# Patient Record
Sex: MALE | Race: WHITE | NOT HISPANIC OR LATINO
[De-identification: names, ages, dates, MRNs, and addresses within clinical notes are randomized per-mention and may not be internally consistent; named-entity substitution may affect disease eponyms.]

---

## 2019-04-01 ENCOUNTER — APPOINTMENT (OUTPATIENT)
Dept: OTOLARYNGOLOGY | Facility: CLINIC | Age: 79
End: 2019-04-01
Payer: MEDICARE

## 2019-04-01 ENCOUNTER — APPOINTMENT (OUTPATIENT)
Age: 79
End: 2019-04-01
Payer: MEDICARE

## 2019-04-01 VITALS
HEART RATE: 76 BPM | DIASTOLIC BLOOD PRESSURE: 61 MMHG | HEIGHT: 68 IN | SYSTOLIC BLOOD PRESSURE: 128 MMHG | WEIGHT: 200 LBS | BODY MASS INDEX: 30.31 KG/M2

## 2019-04-01 DIAGNOSIS — Z78.9 OTHER SPECIFIED HEALTH STATUS: ICD-10-CM

## 2019-04-01 DIAGNOSIS — Z86.39 PERSONAL HISTORY OF OTHER ENDOCRINE, NUTRITIONAL AND METABOLIC DISEASE: ICD-10-CM

## 2019-04-01 DIAGNOSIS — R13.10 DYSPHAGIA, UNSPECIFIED: ICD-10-CM

## 2019-04-01 DIAGNOSIS — Z83.3 FAMILY HISTORY OF DIABETES MELLITUS: ICD-10-CM

## 2019-04-01 DIAGNOSIS — Z80.9 FAMILY HISTORY OF MALIGNANT NEOPLASM, UNSPECIFIED: ICD-10-CM

## 2019-04-01 DIAGNOSIS — H90.3 SENSORINEURAL HEARING LOSS, BILATERAL: ICD-10-CM

## 2019-04-01 DIAGNOSIS — Z86.79 PERSONAL HISTORY OF OTHER DISEASES OF THE CIRCULATORY SYSTEM: ICD-10-CM

## 2019-04-01 DIAGNOSIS — Z82.49 FAMILY HISTORY OF ISCHEMIC HEART DISEASE AND OTHER DISEASES OF THE CIRCULATORY SYSTEM: ICD-10-CM

## 2019-04-01 DIAGNOSIS — R43.9 UNSPECIFIED DISTURBANCES OF SMELL AND TASTE: ICD-10-CM

## 2019-04-01 DIAGNOSIS — Z87.891 PERSONAL HISTORY OF NICOTINE DEPENDENCE: ICD-10-CM

## 2019-04-01 PROCEDURE — 92557 COMPREHENSIVE HEARING TEST: CPT

## 2019-04-01 PROCEDURE — 31575 DIAGNOSTIC LARYNGOSCOPY: CPT

## 2019-04-01 PROCEDURE — 92567 TYMPANOMETRY: CPT

## 2019-04-01 PROCEDURE — 99203 OFFICE O/P NEW LOW 30 MIN: CPT | Mod: 25

## 2019-04-01 NOTE — ASSESSMENT
[FreeTextEntry1] : Cerumen, removed. I reassured him that his audiogram is quite good except very mild high-frequency bilateral loss would recommend yearly cerumen disimpaction and audiogram\par \par Metallic taste for 3 or 4 days. Check results of fungal and bacterial culture. Recommended no new mouthwashes or 2 status. He will very masked when working with this new paint, and will see his internist for a vitamin and metabolic panel if the symptoms persist longer than another 2 weeks. Followup pending those results with me. I reassured him I see no evidence of neoplasm on endoscopy or exam

## 2019-04-01 NOTE — HISTORY OF PRESENT ILLNESS
[de-identified] : Patient seen for the first time today. Reports 3 to four-day history of metallic taste in his tongue and gums, corresponds with using a new pastel paint. may have some mild mouth discomfort, nothing alarming. No dental issues.  Denies new toothpaste, mouthwash, oral exposures, previous episodes, medications, or hot substances. Improved when he were in mask while working with the same painted over the weekend.  No significant identification, dysphasia, concerned about possible neoplasm. New dental issues that he knows of. Denies spitting blood

## 2019-04-08 LAB — FUNGUS SPEC CULT ORG #8: ABNORMAL

## 2021-05-27 ENCOUNTER — NON-APPOINTMENT (OUTPATIENT)
Age: 81
End: 2021-05-27

## 2021-05-27 ENCOUNTER — APPOINTMENT (OUTPATIENT)
Dept: HEART AND VASCULAR | Facility: CLINIC | Age: 81
End: 2021-05-27
Payer: MEDICARE

## 2021-05-27 VITALS — TEMPERATURE: 97 F

## 2021-05-27 VITALS
OXYGEN SATURATION: 96 % | BODY MASS INDEX: 33.34 KG/M2 | SYSTOLIC BLOOD PRESSURE: 134 MMHG | DIASTOLIC BLOOD PRESSURE: 78 MMHG | HEIGHT: 68 IN | HEART RATE: 65 BPM | WEIGHT: 220 LBS

## 2021-05-27 DIAGNOSIS — Z00.00 ENCOUNTER FOR GENERAL ADULT MEDICAL EXAMINATION W/OUT ABNORMAL FINDINGS: ICD-10-CM

## 2021-05-27 PROCEDURE — 93000 ELECTROCARDIOGRAM COMPLETE: CPT

## 2021-05-27 PROCEDURE — 99214 OFFICE O/P EST MOD 30 MIN: CPT | Mod: 25

## 2021-05-27 PROCEDURE — 36415 COLL VENOUS BLD VENIPUNCTURE: CPT

## 2021-05-28 LAB
ALBUMIN SERPL ELPH-MCNC: 4.6 G/DL
ALP BLD-CCNC: 70 U/L
ALT SERPL-CCNC: 16 U/L
ANION GAP SERPL CALC-SCNC: 14 MMOL/L
AST SERPL-CCNC: 19 U/L
BASOPHILS # BLD AUTO: 0.02 K/UL
BASOPHILS NFR BLD AUTO: 0.3 %
BILIRUB SERPL-MCNC: 0.7 MG/DL
BUN SERPL-MCNC: 24 MG/DL
CALCIUM SERPL-MCNC: 9.8 MG/DL
CANCER AG15-3 SERPL-ACNC: 16.8 U/ML
CANCER AG19-9 SERPL-ACNC: 6 U/ML
CANCER AG27-29 SERPL-ACNC: 19.2 U/ML
CEA SERPL-MCNC: 2.9 NG/ML
CHLORIDE SERPL-SCNC: 104 MMOL/L
CHOLEST SERPL-MCNC: 146 MG/DL
CK SERPL-CCNC: 92 U/L
CO2 SERPL-SCNC: 22 MMOL/L
CREAT SERPL-MCNC: 1.26 MG/DL
EOSINOPHIL # BLD AUTO: 0.13 K/UL
EOSINOPHIL NFR BLD AUTO: 2 %
ESTIMATED AVERAGE GLUCOSE: 114 MG/DL
FERRITIN SERPL-MCNC: 205 NG/ML
GLUCOSE SERPL-MCNC: 95 MG/DL
HBA1C MFR BLD HPLC: 5.6 %
HCT VFR BLD CALC: 45.9 %
HDLC SERPL-MCNC: 46 MG/DL
HGB BLD-MCNC: 14.6 G/DL
IMM GRANULOCYTES NFR BLD AUTO: 0.3 %
LDLC SERPL CALC-MCNC: 80 MG/DL
LYMPHOCYTES # BLD AUTO: 1.85 K/UL
LYMPHOCYTES NFR BLD AUTO: 27.9 %
MAN DIFF?: NORMAL
MCHC RBC-ENTMCNC: 31.3 PG
MCHC RBC-ENTMCNC: 31.8 GM/DL
MCV RBC AUTO: 98.3 FL
MONOCYTES # BLD AUTO: 0.69 K/UL
MONOCYTES NFR BLD AUTO: 10.4 %
NEUTROPHILS # BLD AUTO: 3.91 K/UL
NEUTROPHILS NFR BLD AUTO: 59.1 %
NONHDLC SERPL-MCNC: 100 MG/DL
PLATELET # BLD AUTO: 152 K/UL
POTASSIUM SERPL-SCNC: 4.5 MMOL/L
PROT SERPL-MCNC: 6.8 G/DL
PSA SERPL-MCNC: 3.99 NG/ML
RBC # BLD: 4.67 M/UL
RBC # FLD: 12.8 %
SODIUM SERPL-SCNC: 140 MMOL/L
TRIGL SERPL-MCNC: 98 MG/DL
TSH SERPL-ACNC: 2.05 UIU/ML
VIT B12 SERPL-MCNC: 368 PG/ML
WBC # FLD AUTO: 6.62 K/UL

## 2021-05-28 NOTE — REASON FOR VISIT
[FreeTextEntry1] : pt worried about cancer colonscopy 2 years ago lung ct scan dermatologist 6 months ago psa and rectal exam pt with ascending aortic aneurysm 4.0 to 4.2

## 2021-05-28 NOTE — ASSESSMENT
[FreeTextEntry1] : Patient had left rib pain that is actually resolving on a CAT scan in March there was no sign of pathology cancer markers were all negative and patient is up-to-date inHas seen a skin doctor and has a PSA of 3.99 but for an 80-year-old man that is normal doubt that patient has metastatic disease to the bone will follow

## 2021-08-09 ENCOUNTER — NON-APPOINTMENT (OUTPATIENT)
Age: 81
End: 2021-08-09

## 2021-09-16 ENCOUNTER — APPOINTMENT (OUTPATIENT)
Dept: HEART AND VASCULAR | Facility: CLINIC | Age: 81
End: 2021-09-16

## 2021-11-19 ENCOUNTER — RX RENEWAL (OUTPATIENT)
Age: 81
End: 2021-11-19

## 2022-03-15 ENCOUNTER — APPOINTMENT (OUTPATIENT)
Dept: HEART AND VASCULAR | Facility: CLINIC | Age: 82
End: 2022-03-15
Payer: MEDICARE

## 2022-03-15 ENCOUNTER — NON-APPOINTMENT (OUTPATIENT)
Age: 82
End: 2022-03-15

## 2022-03-15 VITALS
TEMPERATURE: 96.5 F | HEART RATE: 66 BPM | SYSTOLIC BLOOD PRESSURE: 154 MMHG | DIASTOLIC BLOOD PRESSURE: 78 MMHG | HEIGHT: 68 IN | BODY MASS INDEX: 31.07 KG/M2 | WEIGHT: 205 LBS | OXYGEN SATURATION: 95 %

## 2022-03-15 PROCEDURE — 93306 TTE W/DOPPLER COMPLETE: CPT

## 2022-03-15 PROCEDURE — 99214 OFFICE O/P EST MOD 30 MIN: CPT | Mod: 25

## 2022-03-15 NOTE — ASSESSMENT
[FreeTextEntry1] : Patient reports that he had a normal CTA about 4 years ago patient has no chest pain or shortness of breath exercising for 2 to 3 hours but feels short of breath when he is walking in the street patient has no chest pain patient's echocardiogram does not reveal significant valvular disease patient will be scheduled for a stress test if he cannot perform that we will repeat the CTA his aorta measured 4.0 on a CT scan from March 31, 2021 and the aortic root did not look dilated on echocardiogram today

## 2022-03-15 NOTE — REASON FOR VISIT
[FreeTextEntry1] : pt in the gym twot three hours feels great when he execises but feels short of breath walking up the street ekg incompletee rbbb echocardiogram aortic sclerosis no stenosis

## 2022-04-07 ENCOUNTER — NON-APPOINTMENT (OUTPATIENT)
Age: 82
End: 2022-04-07

## 2022-04-10 DIAGNOSIS — M25.561 PAIN IN RIGHT KNEE: ICD-10-CM

## 2022-04-11 ENCOUNTER — APPOINTMENT (OUTPATIENT)
Dept: ORTHOPEDIC SURGERY | Facility: CLINIC | Age: 82
End: 2022-04-11
Payer: MEDICARE

## 2022-04-11 PROCEDURE — 99203 OFFICE O/P NEW LOW 30 MIN: CPT

## 2022-04-11 NOTE — DISCUSSION/SUMMARY
[de-identified] : Discussed at length the nature of the patient's condition. Their right knee symptoms appear secondary to a probably tear of the medial meniscus. Suggested we obtain an MRI of the right knee to evaluate intraarticular pathology. If MRI shows a meniscal tear, we will give thought to a surgical arthroscopy. In the interim, I have recommended a course of physiotherapy and Advil or Aleve prn. They can continue activities as tolerated. When results are available, we will discuss further.

## 2022-04-11 NOTE — HISTORY OF PRESENT ILLNESS
[de-identified] : 81 year old male presents for initial evaluation of right knee pain that started about 3 weeks ago. The pt denies any specific accident and/or injury. The pain is localized to the medial joint line and is sharp in nature. Pt uses a cane for ambulation and takes Tylenol prn. Pt denies any previous issues with the knees and states that the pain came out of nowhere.

## 2022-04-11 NOTE — PHYSICAL EXAM
[de-identified] : General appearance: well nourished and hydrated, pleasant, alert and oriented x 3, cooperative.\par HEENT: Normocephalic, EOM intact, Nasal septum midline, Oral cavity clear, External auditory canal clear.\par Cardiovascular: no apparent abnormalities, lower leg edema, no varicosities, pedal pulses are palpable.\par Lymphatics Lymph nodes: none palpated, Lymphedema: not present.\par Neurologic: sensation is normal, no muscle weakness in upper or lower extremities, patella tendon reflexes intact .\par Dermatologic no apparent skin lesions, moist, warm, no rash.\par Spine:cervical spine appears normal and moves freely, thoracic spine appears normal and moves freely, lumbosacral spine appears normal and moves freely.\par Gait: nonantalgic.\par \par Left knee\par Inspection: no effusion or erythema.\par Wounds: none.\par Alignment: normal.\par Palpation: no specific tenderness on palpation.\par ROM active (in degrees): 0-135\par Ligamentous laxity: all ligaments appear stable,, negative ant. drawer test, negative post. drawer test, stable to varus stress test, stable to valgus stress test. negative Lachman's test, negative pivot shift test\par Meniscal Test: negative McMurrays, negative Angela.\par Patellofemoral Alignment Test: Q angle-, normal.\par Muscle Test: good quad strength.\par \par Right knee\par Inspection: no effusion or erythema.\par Wounds: none.\par Alignment: normal.\par Palpation: medial joint line tenderness on palpation.\par ROM active (in degrees): 0-135 with patellofemoral crepitus,.\par Ligamentous laxity: all ligaments appear stable,, negative ant. drawer test, negative post. drawer test, stable to varus stress test, stable to valgus stress test. negative Lachman's test, negative pivot shift test\par Meniscal Test: positive McMurrays, negative Angela.\par Patellofemoral Alignment Test: Q angle-, normal.\par Muscle Test: good quad strength.\par \par Left hip\par Inspection: No swelling or ecchymosis. Surgical incision.\par Wounds: none.\par Palpation: non-tender.\par Stability: no instability.\par Strength: 5/5 all motor groups.\par ROM: no pain with FROM.\par Leg length: equal.\par \par Right hip\par Inspection: No swelling or ecchymosis. Surgical incision.\par Wounds: none.\par Palpation: non-tender.\par Stability: no instability.\par Strength: 5/5 all motor groups.\par ROM: no pain with FROM.\par Leg length: equal.\par \par Left ankle\par Inspection: no erythema noted, no swelling noted.\par Palpation: no pain on palpation .\par ROM: FROM without crepitus.\par Muscle strength: 5/5.\par Stability: no instability noted.\par \par Right ankle\par Inspection: no erythema noted, no swelling noted.\par ROM: FROM without crepitus.\par Palpation: no pain on palpation .\par Muscle strength: 5/5.\par Stability: no instability noted.\par \par Left foot\par Inspection: color, texture and turgor are normal.\par ROM: full range of motion of all joints without pain or crepitus.\par Palpation: no tenderness.\par Stability: no instability noted.\par \par Right foot\par Inspection: color, texture and turgor are normal.\par ROM: full range of motion of all joints without pain or crepitus.\par Palpation: no tenderness.\par Stability: no instability noted.\par \par Left shoulder\par Inspection: no muscle asymmetry, no atrophy.\par Palpation: no tenderness noted, ACJ non-tender.\par ROM: full active ROM, full passive ROM.\par Strength testing): anterior deltoid, supraspinatus, infraspinatus, subscapularis all 5/5.\par Stability test: ant. apprehension negative, post. apprehension negative, relocation test negative.\par Impingement Test: negative NEER.\par \par Right shoulder\par Inspection: no muscle asymmetry, no atrophy.\par Palpation: no tenderness noted, ACJ non-tender.\par ROM: full active ROM, full passive ROM.\par Strength testing): anterior deltoid, supraspinatus, infraspinatus, subscapularis all 5/5.\par Stability test: ant. apprehension negative, post. apprehension negative, relocation test negative.\par Impingement Test: negative NEER.\par Surgical Wounds: none.\par \par Left elbow\par Inspection: negative swelling.\par Wounds: none.\par Palpation: non-tender.\par ROM: full ROM.\par Strength: 5/5 all groups.\par Stability: no instability.\par Mass: none.\par \par Right elbow\par Inspection: negative swelling.\par Wounds: none.\par Palpation: non-tender.\par ROM: full ROM.\par Strength: 5/5 all groups.\par Stability: no instability.\par Mass: none.\par \par Left wrist\par Inspection: negative swelling.\par Wound: none.\par Palpation (bone): no tenderness.\par ROM: full ROM.\par Strength: full , good.\par \par Right wrist\par Inspection: negative swelling.\par Wound: none.\par Palpation (bone): no tenderness.\par ROM: full ROM.\par Strength: full , good.\par \par Left hand \par Inspection: no skin changes, normal appearance.\par Wounds: none. \par Strength: full , able to make full fist.\par Sensation: light touch intact all fingers and thumb.\par Vascular: good capillary refill < 3 seconds, all fingers and thumb.\par Mass: none. \par \par Right hand\par Inspection: no skin changes, normal appearance. \par Wounds: none.\par Strength: full , able to make full fist. \par Sensation: light touch intact all fingers and thumb. \par Vascular: good capillary refill < 3 seconds, all fingers and thumb.\par Mass: none. [de-identified] : X-rays were brought in by the patient. X-ray of the right knee dated 03/21/22 demonstrates normal alignment, joint space maintained, and patella at an appropriate height and central position.

## 2022-04-11 NOTE — END OF VISIT
[FreeTextEntry3] : This note was written by Bozena Bruce on 4/11/22 acting as scribe for Dr. Joseph Jaimes M.D.\par \par I, Dr. Joseph Jaimes, have read and attest that all the information, medical decision making and discharge instructions within are true and accurate.

## 2022-04-15 ENCOUNTER — APPOINTMENT (OUTPATIENT)
Dept: ORTHOPEDIC SURGERY | Facility: CLINIC | Age: 82
End: 2022-04-15
Payer: MEDICARE

## 2022-04-15 DIAGNOSIS — M17.11 UNILATERAL PRIMARY OSTEOARTHRITIS, RIGHT KNEE: ICD-10-CM

## 2022-04-15 DIAGNOSIS — S83.241A OTHER TEAR OF MEDIAL MENISCUS, CURRENT INJURY, RIGHT KNEE, INITIAL ENCOUNTER: ICD-10-CM

## 2022-04-15 PROCEDURE — 99441: CPT | Mod: 95

## 2022-04-19 ENCOUNTER — RX RENEWAL (OUTPATIENT)
Age: 82
End: 2022-04-19

## 2022-04-27 ENCOUNTER — APPOINTMENT (OUTPATIENT)
Dept: HEART AND VASCULAR | Facility: CLINIC | Age: 82
End: 2022-04-27
Payer: MEDICARE

## 2022-04-27 ENCOUNTER — LABORATORY RESULT (OUTPATIENT)
Age: 82
End: 2022-04-27

## 2022-04-27 VITALS
DIASTOLIC BLOOD PRESSURE: 74 MMHG | WEIGHT: 230 LBS | HEIGHT: 68 IN | SYSTOLIC BLOOD PRESSURE: 122 MMHG | BODY MASS INDEX: 34.86 KG/M2 | HEART RATE: 62 BPM

## 2022-04-27 DIAGNOSIS — E78.2 MIXED HYPERLIPIDEMIA: ICD-10-CM

## 2022-04-27 DIAGNOSIS — I71.2 THORACIC AORTIC ANEURYSM, W/OUT RUPTURE: ICD-10-CM

## 2022-04-27 DIAGNOSIS — I10 ESSENTIAL (PRIMARY) HYPERTENSION: ICD-10-CM

## 2022-04-27 PROCEDURE — 93015 CV STRESS TEST SUPVJ I&R: CPT

## 2022-04-27 PROCEDURE — 36415 COLL VENOUS BLD VENIPUNCTURE: CPT

## 2022-04-27 PROCEDURE — 99214 OFFICE O/P EST MOD 30 MIN: CPT | Mod: 25

## 2022-04-27 NOTE — ASSESSMENT
[FreeTextEntry1] : Patient had an echocardiogram in March that did not show LV dysfunction or significant valvular disease patient underwent stress testing today he has the same exercise capacity that he had 2 years ago he is limited by shortness of breath but no ischemic changes patient's knee did not hurt on the treadmill today advise deferring surgery as he is not symptomatic at this time

## 2022-04-27 NOTE — REVIEW OF SYSTEMS
[Dyspnea on exertion] : dyspnea during exertion [Negative] : Integumentary [FreeTextEntry9] : left rib  pain

## 2022-04-29 LAB
ALBUMIN SERPL ELPH-MCNC: 4.6 G/DL
ALP BLD-CCNC: 75 U/L
ALT SERPL-CCNC: 18 U/L
ANION GAP SERPL CALC-SCNC: 21 MMOL/L
APTT BLD: 28.3 SEC
AST SERPL-CCNC: 20 U/L
BASOPHILS # BLD AUTO: 0.03 K/UL
BASOPHILS NFR BLD AUTO: 0.3 %
BILIRUB SERPL-MCNC: 0.6 MG/DL
BUN SERPL-MCNC: 21 MG/DL
CALCIUM SERPL-MCNC: 9.3 MG/DL
CHLORIDE SERPL-SCNC: 106 MMOL/L
CHOLEST SERPL-MCNC: 145 MG/DL
CK SERPL-CCNC: 57 U/L
CO2 SERPL-SCNC: 17 MMOL/L
CREAT SERPL-MCNC: 1.51 MG/DL
EGFR: 46 ML/MIN/1.73M2
EOSINOPHIL # BLD AUTO: 0.19 K/UL
EOSINOPHIL NFR BLD AUTO: 1.8 %
ESTIMATED AVERAGE GLUCOSE: 114 MG/DL
FERRITIN SERPL-MCNC: 201 NG/ML
GLUCOSE SERPL-MCNC: 88 MG/DL
HBA1C MFR BLD HPLC: 5.6 %
HCT VFR BLD CALC: 44.9 %
HDLC SERPL-MCNC: 47 MG/DL
HGB BLD-MCNC: 14.5 G/DL
IMM GRANULOCYTES NFR BLD AUTO: 0.3 %
INR PPP: 0.91 RATIO
LDLC SERPL CALC-MCNC: 72 MG/DL
LYMPHOCYTES # BLD AUTO: 4.82 K/UL
LYMPHOCYTES NFR BLD AUTO: 45.4 %
MAN DIFF?: NORMAL
MCHC RBC-ENTMCNC: 31.5 PG
MCHC RBC-ENTMCNC: 32.3 GM/DL
MCV RBC AUTO: 97.6 FL
MONOCYTES # BLD AUTO: 0.98 K/UL
MONOCYTES NFR BLD AUTO: 9.2 %
NEUTROPHILS # BLD AUTO: 4.56 K/UL
NEUTROPHILS NFR BLD AUTO: 43 %
NONHDLC SERPL-MCNC: 98 MG/DL
PLATELET # BLD AUTO: 190 K/UL
POTASSIUM SERPL-SCNC: 4.4 MMOL/L
PROT SERPL-MCNC: 6.8 G/DL
PT BLD: 10.6 SEC
RBC # BLD: 4.6 M/UL
RBC # FLD: 12.4 %
SODIUM SERPL-SCNC: 144 MMOL/L
TRIGL SERPL-MCNC: 128 MG/DL
TSH SERPL-ACNC: 4.76 UIU/ML
VIT B12 SERPL-MCNC: 393 PG/ML
WBC # FLD AUTO: 10.61 K/UL

## 2022-05-10 ENCOUNTER — APPOINTMENT (OUTPATIENT)
Dept: ORTHOPEDIC SURGERY | Facility: HOSPITAL | Age: 82
End: 2022-05-10

## 2022-05-11 ENCOUNTER — APPOINTMENT (OUTPATIENT)
Dept: HEART AND VASCULAR | Facility: CLINIC | Age: 82
End: 2022-05-11

## 2022-05-23 ENCOUNTER — APPOINTMENT (OUTPATIENT)
Dept: ORTHOPEDIC SURGERY | Facility: CLINIC | Age: 82
End: 2022-05-23

## 2022-06-13 DIAGNOSIS — U07.1 COVID-19: ICD-10-CM

## 2022-07-13 ENCOUNTER — RX RENEWAL (OUTPATIENT)
Age: 82
End: 2022-07-13

## 2022-12-23 RX ORDER — AZITHROMYCIN 250 MG/1
250 TABLET, FILM COATED ORAL
Qty: 1 | Refills: 3 | Status: ACTIVE | COMMUNITY
Start: 2022-12-23 | End: 1900-01-01

## 2023-01-02 ENCOUNTER — NON-APPOINTMENT (OUTPATIENT)
Age: 83
End: 2023-01-02

## 2023-01-04 ENCOUNTER — NON-APPOINTMENT (OUTPATIENT)
Age: 83
End: 2023-01-04

## 2023-02-09 ENCOUNTER — APPOINTMENT (OUTPATIENT)
Dept: HEART AND VASCULAR | Facility: CLINIC | Age: 83
End: 2023-02-09
Payer: MEDICARE

## 2023-02-09 VITALS — HEART RATE: 64 BPM | SYSTOLIC BLOOD PRESSURE: 120 MMHG | DIASTOLIC BLOOD PRESSURE: 72 MMHG

## 2023-02-09 DIAGNOSIS — R06.09 OTHER FORMS OF DYSPNEA: ICD-10-CM

## 2023-02-09 PROCEDURE — 36415 COLL VENOUS BLD VENIPUNCTURE: CPT

## 2023-02-09 PROCEDURE — 93306 TTE W/DOPPLER COMPLETE: CPT

## 2023-02-09 PROCEDURE — 99214 OFFICE O/P EST MOD 30 MIN: CPT | Mod: 25

## 2023-02-09 PROCEDURE — 93015 CV STRESS TEST SUPVJ I&R: CPT

## 2023-02-09 NOTE — REASON FOR VISIT
[Hyperlipidemia] : hyperlipidemia [Hypertension] : hypertension [Symptom and Test Evaluation] : symptom and test evaluation [Other: ____] : [unfilled]

## 2023-02-12 LAB
ALBUMIN SERPL ELPH-MCNC: 4.6 G/DL
ALP BLD-CCNC: 85 U/L
ALT SERPL-CCNC: 23 U/L
ANION GAP SERPL CALC-SCNC: 15 MMOL/L
AST SERPL-CCNC: 21 U/L
BASOPHILS # BLD AUTO: 0.04 K/UL
BASOPHILS NFR BLD AUTO: 0.5 %
BILIRUB SERPL-MCNC: 0.5 MG/DL
BUN SERPL-MCNC: 22 MG/DL
CALCIUM SERPL-MCNC: 10.2 MG/DL
CHLORIDE SERPL-SCNC: 103 MMOL/L
CHOLEST SERPL-MCNC: 131 MG/DL
CK SERPL-CCNC: 69 U/L
CO2 SERPL-SCNC: 22 MMOL/L
CREAT SERPL-MCNC: 1.48 MG/DL
EGFR: 47 ML/MIN/1.73M2
EOSINOPHIL # BLD AUTO: 0.21 K/UL
EOSINOPHIL NFR BLD AUTO: 2.4 %
ESTIMATED AVERAGE GLUCOSE: 117 MG/DL
FERRITIN SERPL-MCNC: 292 NG/ML
GLUCOSE SERPL-MCNC: 91 MG/DL
HBA1C MFR BLD HPLC: 5.7 %
HCT VFR BLD CALC: 45.4 %
HDLC SERPL-MCNC: 44 MG/DL
HGB BLD-MCNC: 14.6 G/DL
IMM GRANULOCYTES NFR BLD AUTO: 0.3 %
LDLC SERPL CALC-MCNC: 61 MG/DL
LYMPHOCYTES # BLD AUTO: 3.06 K/UL
LYMPHOCYTES NFR BLD AUTO: 35.2 %
MAN DIFF?: NORMAL
MCHC RBC-ENTMCNC: 30.9 PG
MCHC RBC-ENTMCNC: 32.2 GM/DL
MCV RBC AUTO: 96 FL
MONOCYTES # BLD AUTO: 0.79 K/UL
MONOCYTES NFR BLD AUTO: 9.1 %
NEUTROPHILS # BLD AUTO: 4.56 K/UL
NEUTROPHILS NFR BLD AUTO: 52.5 %
NONHDLC SERPL-MCNC: 87 MG/DL
PLATELET # BLD AUTO: 197 K/UL
POTASSIUM SERPL-SCNC: 5.1 MMOL/L
PROT SERPL-MCNC: 6.9 G/DL
RBC # BLD: 4.73 M/UL
RBC # FLD: 13 %
SODIUM SERPL-SCNC: 140 MMOL/L
TRIGL SERPL-MCNC: 128 MG/DL
TSH SERPL-ACNC: 2.13 UIU/ML
VIT B12 SERPL-MCNC: 474 PG/ML
WBC # FLD AUTO: 8.69 K/UL

## 2023-02-14 NOTE — ADDENDUM
[FreeTextEntry1] : d/w dr edwin jara hydrate before and after proceedure need definitive dx for salmon

## 2023-02-14 NOTE — ASSESSMENT
[FreeTextEntry1] : Patient had an echocardiogram today that did not show LV dysfunction or significant valvular disease patient underwent stress testing today he has the diminished  exercise capacity that he had 2 years ago he is limited by shortness of breath but no ischemic changes patient has very limited exercise tolerance he has a right bundle branch block no obvious etiology on considering doing a CTA though his creatinine is 1.48 to rule out significant CAD will discuss with Dr. Michael CTA versus nuclear stress test

## 2023-02-14 NOTE — HISTORY OF PRESENT ILLNESS
[FreeTextEntry1] : Patient complaining of shortness of breath on exertion feels that this is worse over the last month or 2 patient says that Dr. 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

## 2023-02-14 NOTE — HISTORY OF PRESENT ILLNESS
[FreeTextEntry1] : pt with dyspnea pt has hx of asbestosis exposure followed with yearly ct scans dr levy\par pt with aaa 4.5 \par pt with htn on losartan/hctz/metopolol

## 2023-02-15 ENCOUNTER — OUTPATIENT (OUTPATIENT)
Dept: OUTPATIENT SERVICES | Facility: HOSPITAL | Age: 83
LOS: 1 days | End: 2023-02-15
Payer: MEDICARE

## 2023-02-15 ENCOUNTER — APPOINTMENT (OUTPATIENT)
Dept: CT IMAGING | Facility: HOSPITAL | Age: 83
End: 2023-02-15
Payer: MEDICARE

## 2023-02-15 LAB — POCT ISTAT CREATININE: 1.6 MG/DL — HIGH (ref 0.5–1.3)

## 2023-02-15 PROCEDURE — 75574 CT ANGIO HRT W/3D IMAGE: CPT | Mod: 26,MH

## 2023-02-15 PROCEDURE — 82565 ASSAY OF CREATININE: CPT

## 2023-02-15 PROCEDURE — 75574 CT ANGIO HRT W/3D IMAGE: CPT

## 2023-03-04 ENCOUNTER — RX RENEWAL (OUTPATIENT)
Age: 83
End: 2023-03-04

## 2023-03-28 ENCOUNTER — APPOINTMENT (OUTPATIENT)
Dept: HEART AND VASCULAR | Facility: CLINIC | Age: 83
End: 2023-03-28
Payer: MEDICARE

## 2023-03-28 DIAGNOSIS — Q75.9 CONGENITAL MALFORMATION OF SKULL AND FACE BONES, UNSPECIFIED: ICD-10-CM

## 2023-03-28 PROCEDURE — 36415 COLL VENOUS BLD VENIPUNCTURE: CPT

## 2023-03-28 PROCEDURE — 99214 OFFICE O/P EST MOD 30 MIN: CPT | Mod: 25

## 2023-03-28 NOTE — REVIEW OF SYSTEMS
[Dyspnea on exertion] : dyspnea during exertion [Negative] : Integumentary [FreeTextEntry9] : left skull pain

## 2023-03-28 NOTE — PHYSICAL EXAM
[Well Developed] : well developed [Well Nourished] : well nourished [No Acute Distress] : no acute distress [Normal Conjunctiva] : normal conjunctiva [Normal Venous Pressure] : normal venous pressure [No Carotid Bruit] : no carotid bruit [Normal S1, S2] : normal S1, S2 [No Murmur] : no murmur [No Rub] : no rub [No Gallop] : no gallop [Clear Lung Fields] : clear lung fields [Good Air Entry] : good air entry [No Respiratory Distress] : no respiratory distress  [Soft] : abdomen soft [Non Tender] : non-tender [No Masses/organomegaly] : no masses/organomegaly [Normal Bowel Sounds] : normal bowel sounds [Normal Gait] : normal gait [No Edema] : no edema [No Cyanosis] : no cyanosis [No Clubbing] : no clubbing [No Varicosities] : no varicosities [No Rash] : no rash [No Skin Lesions] : no skin lesions [Moves all extremities] : moves all extremities [No Focal Deficits] : no focal deficits [Normal Speech] : normal speech [Alert and Oriented] : alert and oriented [Normal memory] : normal memory [de-identified] : slight raise area of left posterior skull

## 2023-03-28 NOTE — ASSESSMENT
[FreeTextEntry1] : d/w radiology and neurology with obtain mri with and without contrast of the brain and skull\par esr and crp

## 2023-03-29 LAB
CRP SERPL-MCNC: <3 MG/L
ERYTHROCYTE [SEDIMENTATION RATE] IN BLOOD BY WESTERGREN METHOD: 7 MM/HR

## 2023-03-30 ENCOUNTER — APPOINTMENT (OUTPATIENT)
Dept: NEUROLOGY | Facility: CLINIC | Age: 83
End: 2023-03-30
Payer: MEDICARE

## 2023-03-30 VITALS
BODY MASS INDEX: 31.83 KG/M2 | HEIGHT: 68 IN | TEMPERATURE: 95.7 F | HEART RATE: 55 BPM | SYSTOLIC BLOOD PRESSURE: 117 MMHG | OXYGEN SATURATION: 96 % | WEIGHT: 210 LBS | DIASTOLIC BLOOD PRESSURE: 74 MMHG

## 2023-03-30 PROCEDURE — 99203 OFFICE O/P NEW LOW 30 MIN: CPT

## 2023-03-30 RX ORDER — GABAPENTIN 100 MG/1
100 CAPSULE ORAL
Qty: 60 | Refills: 0 | Status: ACTIVE | COMMUNITY
Start: 2023-03-30 | End: 1900-01-01

## 2023-04-06 ENCOUNTER — OUTPATIENT (OUTPATIENT)
Dept: OUTPATIENT SERVICES | Facility: HOSPITAL | Age: 83
LOS: 1 days | End: 2023-04-06
Payer: MEDICARE

## 2023-04-06 ENCOUNTER — APPOINTMENT (OUTPATIENT)
Dept: MRI IMAGING | Facility: HOSPITAL | Age: 83
End: 2023-04-06
Payer: MEDICARE

## 2023-04-06 PROCEDURE — 70553 MRI BRAIN STEM W/O & W/DYE: CPT

## 2023-04-06 PROCEDURE — 70553 MRI BRAIN STEM W/O & W/DYE: CPT | Mod: 26,MH

## 2023-04-06 PROCEDURE — A9585: CPT

## 2023-04-07 ENCOUNTER — NON-APPOINTMENT (OUTPATIENT)
Age: 83
End: 2023-04-07

## 2023-05-04 ENCOUNTER — RX RENEWAL (OUTPATIENT)
Age: 83
End: 2023-05-04

## 2023-07-17 NOTE — PHYSICAL EXAM
[FreeTextEntry1] : The patient is alert and oriented x3, naming intact x3, repetition normal, follows three-step commands, and is able to participate fully in the history taking.\par Speech is normal with no evidence of dysarthria. Essentiial tremor dysphonia\par Memory is intact: Immediate recall 3 out of 3, short-term 3 out of 3, remote memory intact\par Cranial nerves II through XII intact\par Motor exam: Upper and lower extremities 5 out of 5 power, normal tone. Essential tremor of both UE left greater than right \par Sensory exam: Intact to light touch and pinprick. Romberg negative.\par Coordination and vestibular exam: Finger to nose intact, no evidence of truncal or appendicular ataxia. No evidence of nystagmus. no vestibular symptoms elicited with head turning during ambulation.\par Gait: Normal stance and gait.\par Reflexes: One to 2+ in upper and lower extremities. No pathological reflexes. Downgoing toes.\par \par HEENT: Normocephalic atraumatic. tenderness of the right occipital notch. \par Funduscopic: No disc edema\par Neck supple with no JVD. No evidence of carotid bruit.\par Cardiac: S1-S2 regular rate and rhythm, no murmur noted.\par Chest: Clear to auscultation\par Abdomen: nontender, normal bowel sounds, soft\par Extremity: No edema, normal pulses.\par \par

## 2023-07-17 NOTE — ASSESSMENT
[FreeTextEntry1] : patient prefers to start gabapentin and see if that is enough to get rid of headache. \par Consider nerve block.

## 2023-07-17 NOTE — HISTORY OF PRESENT ILLNESS
[FreeTextEntry1] : CC; headache\par \par Patient has left sided headache that starts in the back of the head and radiates forward.. OTC meds have not helped and he describes that pain as sharp and radiating.

## 2023-08-15 ENCOUNTER — RX RENEWAL (OUTPATIENT)
Age: 83
End: 2023-08-15

## 2023-09-27 ENCOUNTER — NON-APPOINTMENT (OUTPATIENT)
Age: 83
End: 2023-09-27

## 2023-09-29 ENCOUNTER — RX RENEWAL (OUTPATIENT)
Age: 83
End: 2023-09-29

## 2023-10-03 ENCOUNTER — RX RENEWAL (OUTPATIENT)
Age: 83
End: 2023-10-03

## 2023-10-05 DIAGNOSIS — G47.00 INSOMNIA, UNSPECIFIED: ICD-10-CM

## 2023-10-09 ENCOUNTER — EMERGENCY (EMERGENCY)
Facility: HOSPITAL | Age: 83
LOS: 1 days | Discharge: HOME | End: 2023-10-09
Attending: EMERGENCY MEDICINE | Admitting: EMERGENCY MEDICINE
Payer: MEDICARE

## 2023-10-09 VITALS
SYSTOLIC BLOOD PRESSURE: 151 MMHG | HEIGHT: 68 IN | HEART RATE: 69 BPM | RESPIRATION RATE: 20 BRPM | OXYGEN SATURATION: 94 % | WEIGHT: 209.44 LBS | DIASTOLIC BLOOD PRESSURE: 74 MMHG | TEMPERATURE: 98 F

## 2023-10-09 VITALS
DIASTOLIC BLOOD PRESSURE: 69 MMHG | RESPIRATION RATE: 18 BRPM | OXYGEN SATURATION: 96 % | HEART RATE: 75 BPM | SYSTOLIC BLOOD PRESSURE: 159 MMHG

## 2023-10-09 LAB
ALBUMIN SERPL ELPH-MCNC: 4 G/DL — SIGNIFICANT CHANGE UP (ref 3.3–5)
ALP SERPL-CCNC: 65 U/L — SIGNIFICANT CHANGE UP (ref 40–120)
ALT FLD-CCNC: 16 U/L — SIGNIFICANT CHANGE UP (ref 10–45)
ANION GAP SERPL CALC-SCNC: 9 MMOL/L — SIGNIFICANT CHANGE UP (ref 5–17)
APTT BLD: 29.7 SEC — SIGNIFICANT CHANGE UP (ref 24.5–35.6)
AST SERPL-CCNC: 18 U/L — SIGNIFICANT CHANGE UP (ref 10–40)
BASOPHILS # BLD AUTO: 0.02 K/UL — SIGNIFICANT CHANGE UP (ref 0–0.2)
BASOPHILS NFR BLD AUTO: 0.4 % — SIGNIFICANT CHANGE UP (ref 0–2)
BILIRUB SERPL-MCNC: 0.5 MG/DL — SIGNIFICANT CHANGE UP (ref 0.2–1.2)
BUN SERPL-MCNC: 19 MG/DL — SIGNIFICANT CHANGE UP (ref 7–23)
CALCIUM SERPL-MCNC: 9.3 MG/DL — SIGNIFICANT CHANGE UP (ref 8.4–10.5)
CHLORIDE SERPL-SCNC: 106 MMOL/L — SIGNIFICANT CHANGE UP (ref 96–108)
CO2 SERPL-SCNC: 25 MMOL/L — SIGNIFICANT CHANGE UP (ref 22–31)
CREAT SERPL-MCNC: 1.31 MG/DL — HIGH (ref 0.5–1.3)
EGFR: 54 ML/MIN/1.73M2 — LOW
EOSINOPHIL # BLD AUTO: 0.13 K/UL — SIGNIFICANT CHANGE UP (ref 0–0.5)
EOSINOPHIL NFR BLD AUTO: 2.4 % — SIGNIFICANT CHANGE UP (ref 0–6)
GLUCOSE SERPL-MCNC: 90 MG/DL — SIGNIFICANT CHANGE UP (ref 70–99)
HCT VFR BLD CALC: 41.4 % — SIGNIFICANT CHANGE UP (ref 39–50)
HGB BLD-MCNC: 13.8 G/DL — SIGNIFICANT CHANGE UP (ref 13–17)
IMM GRANULOCYTES NFR BLD AUTO: 0.5 % — SIGNIFICANT CHANGE UP (ref 0–0.9)
INR BLD: 0.93 — SIGNIFICANT CHANGE UP (ref 0.85–1.18)
LYMPHOCYTES # BLD AUTO: 1.37 K/UL — SIGNIFICANT CHANGE UP (ref 1–3.3)
LYMPHOCYTES # BLD AUTO: 24.8 % — SIGNIFICANT CHANGE UP (ref 13–44)
MCHC RBC-ENTMCNC: 31.4 PG — SIGNIFICANT CHANGE UP (ref 27–34)
MCHC RBC-ENTMCNC: 33.3 GM/DL — SIGNIFICANT CHANGE UP (ref 32–36)
MCV RBC AUTO: 94.1 FL — SIGNIFICANT CHANGE UP (ref 80–100)
MONOCYTES # BLD AUTO: 0.44 K/UL — SIGNIFICANT CHANGE UP (ref 0–0.9)
MONOCYTES NFR BLD AUTO: 8 % — SIGNIFICANT CHANGE UP (ref 2–14)
NEUTROPHILS # BLD AUTO: 3.53 K/UL — SIGNIFICANT CHANGE UP (ref 1.8–7.4)
NEUTROPHILS NFR BLD AUTO: 63.9 % — SIGNIFICANT CHANGE UP (ref 43–77)
NRBC # BLD: 0 /100 WBCS — SIGNIFICANT CHANGE UP (ref 0–0)
PLATELET # BLD AUTO: 156 K/UL — SIGNIFICANT CHANGE UP (ref 150–400)
POTASSIUM SERPL-MCNC: 4.4 MMOL/L — SIGNIFICANT CHANGE UP (ref 3.5–5.3)
POTASSIUM SERPL-SCNC: 4.4 MMOL/L — SIGNIFICANT CHANGE UP (ref 3.5–5.3)
PROT SERPL-MCNC: 6.5 G/DL — SIGNIFICANT CHANGE UP (ref 6–8.3)
PROTHROM AB SERPL-ACNC: 10.6 SEC — SIGNIFICANT CHANGE UP (ref 9.5–13)
RBC # BLD: 4.4 M/UL — SIGNIFICANT CHANGE UP (ref 4.2–5.8)
RBC # FLD: 12.5 % — SIGNIFICANT CHANGE UP (ref 10.3–14.5)
SODIUM SERPL-SCNC: 140 MMOL/L — SIGNIFICANT CHANGE UP (ref 135–145)
TROPONIN T, HIGH SENSITIVITY RESULT: 10 NG/L — SIGNIFICANT CHANGE UP (ref 0–51)
WBC # BLD: 5.52 K/UL — SIGNIFICANT CHANGE UP (ref 3.8–10.5)
WBC # FLD AUTO: 5.52 K/UL — SIGNIFICANT CHANGE UP (ref 3.8–10.5)

## 2023-10-09 PROCEDURE — 82962 GLUCOSE BLOOD TEST: CPT

## 2023-10-09 PROCEDURE — 84484 ASSAY OF TROPONIN QUANT: CPT

## 2023-10-09 PROCEDURE — 70450 CT HEAD/BRAIN W/O DYE: CPT | Mod: MA

## 2023-10-09 PROCEDURE — G1004: CPT

## 2023-10-09 PROCEDURE — 70551 MRI BRAIN STEM W/O DYE: CPT | Mod: 26,MG

## 2023-10-09 PROCEDURE — 99291 CRITICAL CARE FIRST HOUR: CPT

## 2023-10-09 PROCEDURE — 70496 CT ANGIOGRAPHY HEAD: CPT | Mod: 26,MA

## 2023-10-09 PROCEDURE — 70498 CT ANGIOGRAPHY NECK: CPT | Mod: MA

## 2023-10-09 PROCEDURE — 85730 THROMBOPLASTIN TIME PARTIAL: CPT

## 2023-10-09 PROCEDURE — 70551 MRI BRAIN STEM W/O DYE: CPT | Mod: MG

## 2023-10-09 PROCEDURE — 70498 CT ANGIOGRAPHY NECK: CPT | Mod: 26,MA

## 2023-10-09 PROCEDURE — 36415 COLL VENOUS BLD VENIPUNCTURE: CPT

## 2023-10-09 PROCEDURE — 93010 ELECTROCARDIOGRAM REPORT: CPT

## 2023-10-09 PROCEDURE — 80053 COMPREHEN METABOLIC PANEL: CPT

## 2023-10-09 PROCEDURE — 99285 EMERGENCY DEPT VISIT HI MDM: CPT | Mod: 25

## 2023-10-09 PROCEDURE — 85610 PROTHROMBIN TIME: CPT

## 2023-10-09 PROCEDURE — 0042T: CPT | Mod: MA

## 2023-10-09 PROCEDURE — 70496 CT ANGIOGRAPHY HEAD: CPT | Mod: MA

## 2023-10-09 PROCEDURE — 85025 COMPLETE CBC W/AUTO DIFF WBC: CPT

## 2023-10-09 PROCEDURE — 93005 ELECTROCARDIOGRAM TRACING: CPT

## 2023-10-09 PROCEDURE — 70450 CT HEAD/BRAIN W/O DYE: CPT | Mod: 26,MA,XU

## 2023-10-09 RX ORDER — DIAZEPAM 5 MG
1 TABLET ORAL
Refills: 0 | DISCHARGE

## 2023-10-09 RX ORDER — MIRABEGRON 50 MG/1
1 TABLET, EXTENDED RELEASE ORAL
Refills: 0 | DISCHARGE

## 2023-10-09 RX ORDER — METOPROLOL TARTRATE 50 MG
1 TABLET ORAL
Refills: 0 | DISCHARGE

## 2023-10-09 RX ORDER — LOSARTAN POTASSIUM 100 MG/1
1 TABLET, FILM COATED ORAL
Refills: 0 | DISCHARGE

## 2023-10-09 RX ORDER — SODIUM CHLORIDE 9 MG/ML
500 INJECTION INTRAMUSCULAR; INTRAVENOUS; SUBCUTANEOUS ONCE
Refills: 0 | Status: COMPLETED | OUTPATIENT
Start: 2023-10-09 | End: 2023-10-09

## 2023-10-09 RX ORDER — ATORVASTATIN CALCIUM 80 MG/1
1 TABLET, FILM COATED ORAL
Refills: 0 | DISCHARGE

## 2023-10-09 RX ADMIN — SODIUM CHLORIDE 500 MILLILITER(S): 9 INJECTION INTRAMUSCULAR; INTRAVENOUS; SUBCUTANEOUS at 12:16

## 2023-10-09 NOTE — ED PROVIDER NOTE - PROGRESS NOTE DETAILS
CT head neg, ct perfusion neg, cta w mild L carotid stenosis.  Stroke team ordered a short stroke mri.  Await results and stroke recs/dispo. MRI neg.  Pt cleared for outpt fu w Dr Montes who has seen pt prev.  Pt's pmd, Dr Breen, contacted w update.  Pt denies jimenez, feeling well.  Pt did not take bp meds today; preferred to take once he goes home.

## 2023-10-09 NOTE — ED PROVIDER NOTE - CARE PLAN
1 Principal Discharge DX:	Abnormal gait  Secondary Diagnosis:	Confusion  Secondary Diagnosis:	Headache

## 2023-10-09 NOTE — ED PROVIDER NOTE - PHYSICAL EXAMINATION
VITAL SIGNS: I have reviewed nursing notes and confirm.  CONSTITUTIONAL:  in no acute distress.   SKIN:  warm and dry, no acute rash.   HEAD:  normocephalic, atraumatic.  EYES: PERRL, EOM intact; conjunctiva and sclera clear.  ENT: No nasal discharge; airway clear.   NECK: Supple; non tender.  CARD: S1, S2 normal; no murmurs, gallops, or rubs. Regular rate and rhythm.   RESP:  Clear to auscultation b/l, no wheezes, rales or rhonchi.  ABD: Normal bowel sounds; soft; non-distended; non-tender; no guarding/ rebound.  MSK: Normal ROM. No clubbing, cyanosis or edema. no ttp bilat le  NEURO: awake, alert, oriented x 3, CN II-XII grossly intact, motor 5/5, no gross sens deficits, speech clear but hesitating  PSYCH: Cooperative, mood and affect appropriate. VITAL SIGNS: I have reviewed nursing notes and confirm.  CONSTITUTIONAL:  in no acute distress.   SKIN:  warm and dry, no acute rash.   HEAD:  normocephalic, atraumatic.  EYES: PERRL, EOM intact; conjunctiva and sclera clear.  ENT: No nasal discharge; airway clear.   NECK: Supple; non tender.  CARD: S1, S2 normal; no murmurs, gallops, or rubs. Regular rate and rhythm.   RESP:  Clear to auscultation b/l, no wheezes, rales or rhonchi.  ABD: Normal bowel sounds; soft; non-distended; non-tender; no guarding/ rebound.  MSK: Normal ROM. No clubbing, cyanosis or edema. no ttp bilat le  NEURO: awake, alert, oriented x 3, CN II-XII grossly intact, motor 5/5, no gross sens deficits, speech clear but hesitating, slight leaning to R when ambulating  PSYCH: Cooperative, mood and affect appropriate.

## 2023-10-09 NOTE — CONSULT NOTE ADULT - ASSESSMENT
83y Male with PMHx of 84 yo M h/o HTN, CAD, AAA(4 cm, stable per pt), occipital neuralgia (prescribed gabapentin in the past by Dr. Montes, [facility], [place] presents to the ED for evaluation of ongoing headache, unsteady gait since 10/7, and slow speech noted upon waking this morning. Stroke code called in the ED. NIHSS 0, mild right leaning while walking. 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 MRI done in the ED negative for any ischemic changes since prior MR in April 2023. Offered General Neurology eval in the ED but patient prefers to follow up outpatient.     Recommendations:  - Continue ASA 81mg daily  - Continue high dose statin  - Please follow up with your PCP and your neurologist Dr. Alissa Montes    Case discussed with neurology attending Dr. Mesha Gallego.

## 2023-10-09 NOTE — CONSULT NOTE ADULT - SUBJECTIVE AND OBJECTIVE BOX
**STROKE CODE CONSULT NOTE**    Last known well time/Time of onset of symptoms: 10/7/2023    HPI: 83y Male with PMHx of 82 yo M h/o HTN, CAD, AAA(4 cm, stable per pt), occipital neuralgia (prescribed gabapentin in the past by Dr. Montes, HLD, Torres Martinez presents to the ED for evaluation of headache, unsteady gait, and slow speech. The patient has been treated for occipital neuralgia in the past with gabapentin that he felt like resolved his occipital headache. He has been under a lot of stress with timelines for work but developed a frontal headache for the past couple of weeks. The headache comes and goes and has not been persistent, he has not noted any pattern. On 10/7 he began feeling unbalanced and slightly unsteady on his feet. He has a remote history of vertigo but this felt different then the vertigo he experienced in the past. He did not experience any room spinning but felt as though he was spinning. This morning since he work up he felt as though he is able to converse but the words are coming slowly to him. Stroke code called in the ED. NIHSS 0, patient able to ambulate without assistance (rarely needs a cane at baseline), gait narrow and steady, appears to be slightly right leaning. CTH negative for any acute intracranial pathology. CTA with  2.5 mm aneurysm involving the right A1/A2 anterior communicating artery complex, mild left carotid stenosis. CTP negative. Patient out of the window for any acute intervention.     T(C): 36.6 (10-09-23 @ 11:09), Max: 36.6 (10-09-23 @ 11:09)  HR: 75 (10-09-23 @ 14:26) (63 - 75)  BP: 159/69 (10-09-23 @ 14:26) (151/74 - 159/69)  RR: 18 (10-09-23 @ 14:26) (18 - 20)  SpO2: 96% (10-09-23 @ 14:26) (94% - 97%)    PAST MEDICAL & SURGICAL HISTORY:  [place] Martinez (hard of hearing)      HTN (hypertension)      HLD (hyperlipidemia)      Occipital neuralgia      CAD (coronary artery disease)      History of abdominal aortic aneurysm (AAA)          FAMILY HISTORY:      SOCIAL HISTORY:   Smoking status: Denies  Drinking: Denies  Drug Use: Denies    ROS:   Constitutional: No fever, weight loss or fatigue  Eyes: No eye pain, visual disturbances, or discharge  ENMT:  No difficulty hearing, tinnitus; No sinus or throat pain  Neck: No pain or stiffness  Respiratory: No cough, wheezing, chills or hemoptysis  Cardiovascular: No chest pain, palpitations, shortness of breath, or leg swelling  Gastrointestinal: No abdominal pain. No nausea, vomiting or hematemesis; No diarrhea or constipation. Nohematochezia.  Genitourinary: No dysuria, frequency, hematuria or incontinence  Neurological: As per HPI  Skin: No itching, burning, rashes or lesions   Endocrine: No heat or cold intolerance; No hair loss  Musculoskeletal: No joint pain or swelling; No muscle, back or extremity pain  Heme/Lymph: No easy bruising or bleeding gums    MEDICATIONS  (STANDING):    MEDICATIONS  (PRN):    Allergies    No Known Allergies    Intolerances      Vital Signs Last 24 Hrs  T(C): 36.6 (09 Oct 2023 11:09), Max: 36.6 (09 Oct 2023 11:09)  T(F): 97.9 (09 Oct 2023 11:09), Max: 97.9 (09 Oct 2023 11:09)  HR: 75 (09 Oct 2023 14:26) (63 - 75)  BP: 159/69 (09 Oct 2023 14:26) (151/74 - 159/69)  BP(mean): --  RR: 18 (09 Oct 2023 14:26) (18 - 20)  SpO2: 96% (09 Oct 2023 14:26) (94% - 97%)    Parameters below as of 09 Oct 2023 14:26  Patient On (Oxygen Delivery Method): room air        Physical exam:  Constitutional: No acute distress, conversant  Eyes: Anicteric sclerae, moist conjunctivae, see below for CNs  Neck: trachea midline  Cardiovascular: Regular rate and rhythm on monitor  Pulmonary: No use of accessory muscles  GI: Abdomen soft  Extremities:  no edema    Neurologic:  -Mental status: Awake, alert, oriented to person, place, and time. Speech is fluent with intact naming, repetition, and comprehension, no dysarthria. Recent and remote memory intact. Follows commands. Attention/concentration intact. Fund of knowledge appropriate.  -Cranial nerves:   II: Visual fields are full to confrontation.  III, IV, VI: Extraocular movements are intact without nystagmus. Pupils equally round and reactive to light  V:  Facial sensation V1-V3 equal and intact   VII: Face is symmetric with normal smile  Motor: Normal bulk and tone. No pronator drift. Strength bilateral upper extremity 5/5, bilateral lower extremities 5/5.  Rapid alternating movements intact and symmetric  Sensation: Intact to light touch bilaterally. No neglect or extinction on double simultaneous testing.  Coordination: No dysmetria on finger-to-nose bilaterally  Gait: Narrow gait and steady, slight right leaning    NIHSS: 0    Fingerstick Blood Glucose: CAPILLARY BLOOD GLUCOSE  90 (09 Oct 2023 13:52)      POCT Blood Glucose.: 90 mg/dL (09 Oct 2023 11:09)    LABS:                        13.8   5.52  )-----------( 156      ( 09 Oct 2023 11:45 )             41.4     10-09    140  |  106  |  19  ----------------------------<  90  4.4   |  25  |  1.31<H>    Ca    9.3      09 Oct 2023 11:45    TPro  6.5  /  Alb  4.0  /  TBili  0.5  /  DBili  x   /  AST  18  /  ALT  16  /  AlkPhos  65  10-09    PT/INR - ( 09 Oct 2023 11:45 )   PT: 10.6 sec;   INR: 0.93          PTT - ( 09 Oct 2023 11:45 )  PTT:29.7 sec      Urinalysis Basic - ( 09 Oct 2023 11:45 )    Color: x / Appearance: x / SG: x / pH: x  Gluc: 90 mg/dL / Ketone: x  / Bili: x / Urobili: x   Blood: x / Protein: x / Nitrite: x   Leuk Esterase: x / RBC: x / WBC x   Sq Epi: x / Non Sq Epi: x / Bacteria: x        RADIOLOGY & ADDITIONAL STUDIES:     CT Brain Stroke Protocol (10.09.23 @ 11:23) >    IMPRESSION: No intracranial hemorrhage or acute transcortical infarct.    CT Angio Brain Stroke Protocol  w/ IV Cont (10.09.23 @ 11:36)     IMPRESSION: No large vessel occlusion. Variant ACAanatomy with   approximately 2.5 mm aneurysm involving the right A1/A2 anterior   communicating artery complex.    CT Angio Neck Stroke Protocol w/ IV Cont (10.09.23 @ 11:35) >  IMPRESSION: Mild left carotid stenosis.      CT Brain Perfusion Maps Stroke (10.09.23 @ 11:34) >  IMPRESSION: Normal CT perfusion study.    MR Head No Cont (10.09.23 @ 13:42)   IMPRESSION: Age-appropriate involutional and ischemic gliotic changes. No   acute infarcts, hemorrhage or mass. No change from 4/6/2023.                    -----------------------------------------------------------------------------------------------------------------  IV-tNK (Y/N):    N                            Bolus time:    Tenecteplase Dose Verification w/ RN:  Reason IV-tNK not given: Out of the window

## 2023-10-09 NOTE — ED PROVIDER NOTE - OBJECTIVE STATEMENT
84 yo M h/o htn, cad, occipital neurlgia, hld, [place] c/o ~ 1 wk of feeling unsteady on his feet, frontal ha x 3 d, w mild sx of word finding difficulty and confused thought for a few days but worse this am. Pt notes "vision strain."  No cp, palpitations, sob  No recent head trauma; + head trauma ~ 6 mo ago after which he developed the occipital neuralgia which improved w gabapentin. 84 yo M h/o htn, cad, aaa (4 cm, stable per pt), occipital neurlgia, hld, [place] c/o ~ 1 wk of feeling unsteady on his feet, frontal ha x 3 d, w mild sx of word finding difficulty and confused thought for a few days but worse this am. Pt notes "vision strain."  No cp, palpitations, sob  No recent head trauma; + head trauma ~ 6 mo ago after which he developed the occipital neuralgia which improved w gabapentin.

## 2023-10-09 NOTE — ED PROVIDER NOTE - NSFOLLOWUPINSTRUCTIONS_ED_ALL_ED_FT
Gait change  Confusion  Headache    Please follow up with Dr Breen and Dr Montes.      Return for increased pain, change in behavior, change in vision/speech/gait, numbness or weakness in extremities, vomiting, any other concerns.

## 2023-10-09 NOTE — ED PROVIDER NOTE - PATIENT PORTAL LINK FT
You can access the FollowMyHealth Patient Portal offered by [facility] by registering at the following website: [email or web address]. By joining [facility]’s FollowMyHealth portal, you will also be able to view your health information using other applications (apps) compatible with our system.

## 2023-10-09 NOTE — ED ADULT NURSE NOTE - CHPI ED NUR DURATION
today Spironolactone Counseling: Patient advised regarding risks of diarrhea, abdominal pain, hyperkalemia, birth defects (for female patients), liver toxicity and renal toxicity. The patient may need blood work to monitor liver and kidney function and potassium levels while on therapy. The patient verbalized understanding of the proper use and possible adverse effects of spironolactone.  All of the patient's questions and concerns were addressed.

## 2023-10-09 NOTE — ED ADULT TRIAGE NOTE - CHIEF COMPLAINT QUOTE
send in by PCP for r/o stroke. states he has had unsteady gait and dizziness since Saturday. rpts "difficulty speaking and forming thoughts since 930AM."

## 2023-10-09 NOTE — STROKE CODE NOTE - NIH STROKE SCALE: TOTAL
Please review [facility] patient education handout.    Keratosis pilaris can be treated with acid containing moisturizing creams several times per day, for example Cerave.  [facility] website has excellent information in their diagnoses and conditions reference section also. ([email or web address]).  
0

## 2023-10-09 NOTE — ED PROVIDER NOTE - CLINICAL SUMMARY MEDICAL DECISION MAKING FREE TEXT BOX
Pt c/o 1 wk imbalance, 3 d ha, several days word finding issues/confusion, worse today.  Stroke code called.  Plan ct, cta, ct perfusion, stroke team eval, labs, ekg; reassess. See progress notes for further mdm related documentation.

## 2023-10-09 NOTE — ED PROVIDER NOTE - CARE PROVIDER_API CALL
Laurita Breen  Cardiovascular Disease  [address], [address]  [place] [address]  Phone: [phone]  Fax: [phone]  Follow Up Time:     Alissa Montes  Neurology  [address] [address]  Phone: [phone]  Fax: [phone]  Follow Up Time:

## 2023-10-09 NOTE — ED ADULT NURSE NOTE - NSICDXPASTMEDICALHX_GEN_ALL_CORE_FT
PAST MEDICAL HISTORY:  CAD (coronary artery disease)     HLD (hyperlipidemia)     [place] (hard of hearing)     HTN (hypertension)     Occipital neuralgia

## 2023-10-11 DIAGNOSIS — R41.0 DISORIENTATION, UNSPECIFIED: ICD-10-CM

## 2023-10-11 DIAGNOSIS — H53.8 OTHER VISUAL DISTURBANCES: ICD-10-CM

## 2023-10-11 DIAGNOSIS — R26.9 UNSPECIFIED ABNORMALITIES OF GAIT AND MOBILITY: ICD-10-CM

## 2023-10-11 DIAGNOSIS — I44.0 ATRIOVENTRICULAR BLOCK, FIRST DEGREE: ICD-10-CM

## 2023-10-11 DIAGNOSIS — I65.22 OCCLUSION AND STENOSIS OF LEFT CAROTID ARTERY: ICD-10-CM

## 2023-10-11 DIAGNOSIS — I67.1 CEREBRAL ANEURYSM, NONRUPTURED: ICD-10-CM

## 2023-10-11 DIAGNOSIS — E78.5 HYPERLIPIDEMIA, UNSPECIFIED: ICD-10-CM

## 2023-10-11 DIAGNOSIS — I25.10 ATHEROSCLEROTIC HEART DISEASE OF NATIVE CORONARY ARTERY WITHOUT ANGINA PECTORIS: ICD-10-CM

## 2023-10-11 DIAGNOSIS — I10 ESSENTIAL (PRIMARY) HYPERTENSION: ICD-10-CM

## 2023-10-11 DIAGNOSIS — I45.10 UNSPECIFIED RIGHT BUNDLE-BRANCH BLOCK: ICD-10-CM

## 2023-10-11 DIAGNOSIS — R51.9 HEADACHE, UNSPECIFIED: ICD-10-CM

## 2023-10-11 DIAGNOSIS — R26.81 UNSTEADINESS ON FEET: ICD-10-CM

## 2023-10-11 PROBLEM — M54.81 OCCIPITAL NEURALGIA: Chronic | Status: ACTIVE | Noted: 2023-10-09

## 2023-10-11 PROBLEM — Z86.79 PERSONAL HISTORY OF OTHER DISEASES OF THE CIRCULATORY SYSTEM: Chronic | Status: ACTIVE | Noted: 2023-10-09

## 2023-10-11 PROBLEM — H91.90 UNSPECIFIED HEARING LOSS, UNSPECIFIED EAR: Chronic | Status: ACTIVE | Noted: 2023-10-09

## 2023-10-12 ENCOUNTER — NON-APPOINTMENT (OUTPATIENT)
Age: 83
End: 2023-10-12

## 2023-10-13 ENCOUNTER — APPOINTMENT (OUTPATIENT)
Dept: NEUROLOGY | Facility: CLINIC | Age: 83
End: 2023-10-13
Payer: MEDICARE

## 2023-10-13 ENCOUNTER — NON-APPOINTMENT (OUTPATIENT)
Age: 83
End: 2023-10-13

## 2023-10-13 VITALS
DIASTOLIC BLOOD PRESSURE: 77 MMHG | HEART RATE: 58 BPM | BODY MASS INDEX: 31.98 KG/M2 | WEIGHT: 211 LBS | TEMPERATURE: 98.2 F | OXYGEN SATURATION: 96 % | SYSTOLIC BLOOD PRESSURE: 144 MMHG | HEIGHT: 68 IN

## 2023-10-13 DIAGNOSIS — M54.81 OCCIPITAL NEURALGIA: ICD-10-CM

## 2023-10-13 PROCEDURE — 99214 OFFICE O/P EST MOD 30 MIN: CPT

## 2023-10-20 RX ORDER — DIAZEPAM 5 MG/1
5 TABLET ORAL DAILY
Qty: 30 | Refills: 0 | Status: DISCONTINUED | COMMUNITY
Start: 2022-03-15 | End: 2023-10-20

## 2023-10-20 RX ORDER — TRIAZOLAM 0.12 MG/1
0.12 TABLET ORAL
Qty: 7 | Refills: 0 | Status: DISCONTINUED | COMMUNITY
Start: 2023-09-28 | End: 2023-10-20

## 2023-10-20 RX ORDER — HYDROCODONE BITARTRATE AND HOMATROPINE METHYLBROMIDE 1.5; 5 MG/5ML; MG/5ML
5-1.5 SOLUTION ORAL
Qty: 60 | Refills: 0 | Status: ACTIVE | COMMUNITY
Start: 2022-06-13 | End: 1900-01-01

## 2023-10-24 ENCOUNTER — APPOINTMENT (OUTPATIENT)
Dept: MRI IMAGING | Facility: HOSPITAL | Age: 83
End: 2023-10-24

## 2023-10-24 ENCOUNTER — OUTPATIENT (OUTPATIENT)
Dept: OUTPATIENT SERVICES | Facility: HOSPITAL | Age: 83
LOS: 1 days | End: 2023-10-24
Payer: MEDICARE

## 2023-10-24 ENCOUNTER — NON-APPOINTMENT (OUTPATIENT)
Age: 83
End: 2023-10-24

## 2023-10-24 DIAGNOSIS — R26.81 UNSTEADINESS ON FEET: ICD-10-CM

## 2023-10-24 PROCEDURE — 70553 MRI BRAIN STEM W/O & W/DYE: CPT | Mod: 26,MH

## 2023-10-24 PROCEDURE — A9585: CPT

## 2023-10-24 PROCEDURE — 70553 MRI BRAIN STEM W/O & W/DYE: CPT

## 2023-12-06 ENCOUNTER — APPOINTMENT (OUTPATIENT)
Dept: OTOLARYNGOLOGY | Facility: CLINIC | Age: 83
End: 2023-12-06
Payer: MEDICARE

## 2023-12-06 DIAGNOSIS — H61.23 IMPACTED CERUMEN, BILATERAL: ICD-10-CM

## 2023-12-06 PROCEDURE — 69210 REMOVE IMPACTED EAR WAX UNI: CPT

## 2023-12-06 PROCEDURE — 99203 OFFICE O/P NEW LOW 30 MIN: CPT | Mod: 25

## 2023-12-19 NOTE — ED ADULT TRIAGE NOTE - GLASGOW COMA SCALE: BEST MOTOR RESPONSE, MLM
Recent issues:  Diabetes follow-up evaluation  Taking the Fiasp and Lantus insulin plan, also using the Freestyle Libre2 CGM sensor  Continues taking the Remicaid and (same) low dose Prednisone 2.5 mg daily  Recent PCP appt, told of low iron levels  Now has new smartphone, plans to download Libre2 merry soon          1973. Diagnosis of diabetes mellitus age 2 1/2, living in [facility]  Initial treatment with NPH and Regular insulin medications  Had seen Dr. Sarah Sierra/[facility]  Switched to MDI insulin plan using Nv and Levemir insulin     8/19/14. Initial consult with me at my former clinic ([facility])  8/20/14. CDE RD evaluation, also tried demo OmniPod pump  Used Novolog Echo 1/2U pen, with I:C carb counting method  Tried Lantus BID dosing, then Tresiba (expensive), then Basaglaru              Previously used Novolog insulin  5/2018. Switched to Fiasp 3/2018, then Fiasp non-formulary 2019  Meals typically brunch 10am and supper 9:30pm  Takes Prednisone for RA, current dose 3 mg QAM  Discussed hypoglycemia treatment options, jelly beans vs glucose tablets    Current DM meds:  Lantus Solostar                      12U each morning  Fiasp Flextouch                         Breakfast  1:12 (typically 6-7U)   Supper   1:12 (typically 6-10U)                                                  sscale 1U per 40>125                                                  target  mg/dl    Frequently takes Fiasp postmeal    Has InsulinCalculator smart phone merry, but not used  Using Accucheck Guide BG meter              Has tested up to 4x/day              Does own I:C and ISF calculations  9/2019. She has worn a free sample LibrePersonal continuous glucose sensor              Started Freestyle Mary CGM, then upgraded to Libre2 CGM with [place]    Recent Freestyle Libre2 data:          Previous [facility] labs include:  Lab Results   Component Value Date    A1C 7.3 (H) 11/22/2023     11/22/2023    POTASSIUM 3.7 11/22/2023     CHLORIDE 105 11/22/2023    CO2 28 11/22/2023    ANIONGAP 8 11/22/2023    GLC 71 11/22/2023    BUN 15.8 11/22/2023    CR 0.96 (H) 11/22/2023    GFRESTIMATED 70 11/22/2023    GFRESTBLACK 87 02/19/2021    FINESSE 9.2 11/22/2023    CHOL 126 11/22/2023    TRIG 71 11/22/2023    HDL 53 11/22/2023    LDL 59 11/22/2023    NHDL 73 11/22/2023    UCRR 160.0 11/22/2023    MICROL 28.1 11/22/2023    UMALCR 17.56 11/22/2023     Lab Results   Component Value Date    TSH 3.51 11/22/2023    T4 0.96 02/18/2011     No history of vascular disease.  Takes simvastatin for hyperlipidemia management.  DM Complications:              Retinopathy                          Previous proliferative DR and bilateral laser PC surgeries                          Had cataracts, retinal detachment repair OD, higher occular pressures                          Significant vision loss, needs print magnification                          Sees Fer Davison and LUCY Ahmadi/ophthalmology              Neuropathy:                          Decreased sensation in feet          Single, lives in [place], [place]; works at [employer] job at [employer]  Exercises at [employer], enjoys exercise classes  Sees Dr. Humberto Melendez/[facility] for general medicine evaluations.  Also sees Dr. Blanco/[facility] Rheumatology      PMH/PSH:  Past Medical History:   Diagnosis Date    Diabetic retinopathy associated with type 1 diabetes mellitus (H)     proliferative DR and bilateral laser PC surgeries    Dry eyes     Hyperlipidemia LDL goal < 130     RA (rheumatoid arthritis) (H)     seeing rheumatologist.    Sjoegren syndrome     seeing rheumatologist.    Type 1 diabetes mellitus (H)     retinopathy, neuropathy    Visual impairment      Past Surgical History:   Procedure Laterality Date    [facility] NONSPECIFIC PROCEDURE  8/00/97    T & A    Z NONSPECIFIC PROCEDURE  5/00/99    L eye surgery    [facility] NONSPECIFIC PROCEDURE  4/10/01    R cataract + IOL    [facility]  NONSPECIFIC PROCEDURE  2002    bilat vitrectomy    [facility] NONSPECIFIC PROCEDURE  7/04    R eye vitrectomy    [facility] NONSPECIFIC PROCEDURE  5/08    R vitrectomy & partial retinal detachment        Family Hx:  Family History   Problem Relation Age of Onset    Arthritis Mother         on celebrex,alive & healthy 2002    Breast Cancer Mother     Syncope Mother     Arthritis Father         on celebrex(2002)    Heart Disease Father         heart attack 1990, 60 years old (2002)    Diabetes Father     Cerebrovascular Disease Father         age 70    Thyroid Disease Sister         on thyroid med for couple of years , 35 yrs now(2002)    Cancer Paternal Grandmother         STOMACH    Cerebrovascular Disease Paternal Grandfather         age 90    Breast Cancer Cousin 55    Colorectal Cancer Cousin     Breast Cancer Other     Colorectal Cancer Other          Social Hx:  Social History     Socioeconomic History    Marital status: Single     Spouse name:     Number of children: 0    Years of education: Not on file    Highest education level: Not on file   Occupational History    Occupation: [employer]     Employer: [employer]   Tobacco Use    Smoking status: Never    Smokeless tobacco: Never   Substance and Sexual Activity    Alcohol use: Yes     Comment: 1 drink every 3 weeks    Drug use: No    Sexual activity: Not Currently     Partners: Male     Comment:    Other Topics Concern    Parent/sibling w/ CABG, MI or angioplasty before 65F 55M? Not Asked   Social History Narrative    Not on file     Social Determinants of Health     Financial Resource Strain: Not on file   Food Insecurity: Not on file   Transportation Needs: Not on file   Physical Activity: Not on file   Stress: Not on file   Social Connections: Not on file   Interpersonal Safety: Low Risk  (11/22/2023)    Interpersonal Safety     Do you feel physically and emotionally safe where you currently live?: Yes     Within the past 12 months, have  "you been hit, slapped, kicked or otherwise physically hurt by someone?: No     Within the past 12 months, have you been humiliated or emotionally abused in other ways by your partner or ex-partner?: No   Housing Stability: Not on file          MEDICATIONS:  has a current medication list which includes the following prescription(s): brimonidine, freestyle thea 2 sensor, restasis, dorzolamide-timolol, ferrous sulfate, infliximab, insulin aspart, lantus solostar, latanoprost, losartan, methotrexate, fish oil triple strength, one daily multiple vitamin, prednisone, sulfasalazine, valacyclovir, blood glucose, blood glucose, blood glucose monitoring, blood glucose, continuous blood glucose monitoring, diclofenac, folic acid, bd adelfo u/f, and simvastatin.        ROS: 10 point ROS neg other than the symptoms noted above in the HPI.     GENERAL: some fatigue; weight stable; denies fevers, chills, night sweats.   HEENT: minimal/no vision from right eye, dry eyes/mouth; no dysphagia, odonophagia, diplopia  THYROID:  no apparent hyper or hypothyroid symptoms  CV: no chest pain, pressure, palpitations  LUNGS: no SOB, FUENTES, cough, wheezing   ABDOMEN: rare morning nausea; no diarrhea, constipation, abdominal pain  EXTREMITIES: no rashes, ulcers, edema  NEUROLOGY: decreased sensation at feet; no headaches, denies changes in vision, tingling, extremitiy numbness   MSK: mild hand tendinitis pains; no muscle aches, weakness  SKIN: scalp hair thinning; denies rashes or lesions/foot sores  :  no menses since stopping OCP medication 1/2021  PSYCH:  stable mood, no significant anxiety or depression  ENDOCRINE: intermittent hot flashes    Physical Exam   VS: BP (!) 147/84   Pulse 84   Ht 1.613 m (5' 3.5\")   Wt 51.9 kg (114 lb 6.4 oz)   LMP 11/20/2023   BMI 19.95 kg/m     CONSTITUTIONAL: healthy, alert and NAD, well dressed, answering questions appropriately  ENT: no nose swelling or nasal discharge, mouth redness or gum " changes.  EYES: eyes grossly normal to inspection, conjunctivae and sclerae normal, no exophthalmos or proptosis  THYROID:  no apparent nodules or goiter  LUNGS: no audible wheeze, cough or visible cyanosis, no visible retractions or increased work of breathing  ABDOMEN: abdomen normal size  EXTREMITIES: slowed flexion with left middle finger; no hand tremors, limited exam  NEUROLOGY: CN grossly intact, mentation intact and speech normal   SKIN:  no apparent skin lesions, rash, or edema with visualized skin appearance  PSYCH: mentation appears normal, affect normal/bright, judgement and insight intact,   normal speech and appearance well groomed    LABS:     All pertinent notes, labs, and images personally reviewed by me.        A/P:  Encounter Diagnoses   Name Primary?    Type 1 diabetes mellitus with proliferative retinopathy, macular edema presence unspecified, unspecified laterality, unspecified proliferative retinopathy type (H) Yes    Type 1 diabetes mellitus with diabetic neuropathy (H)      Comments:  Reviewed the diabetes and health history  Recent SG trends indicate higher afternoon postprandial glucose levels  Reviewed and interpreted tests that I previously ordered.   Ordered appropriate tests for the endocrinology disease management.    Management options discussed and implemented after shared medical decision making with the patient.  T1DM problem is chronic-stable    Plan:  Discussed general issues with the diabetes diagnosis and managemen  We discussed the hgbA1c test which reflects previous overall glucose levels or control  Discussed the importance of blood glucose (BG) testing to assess glucose trends  Discussed use of the Accu-check Guide BG meter  Provided general overview of the multiple daily injection (MDI) plan using rapid acting mealtime               and longacting insulin medications  Reviewed recent Freestyle Libre2 CGM glucose sensor trend data, in detail      Recommend:  Continue  current Fiasp mealtime & Lantus insulin MDI plan  Lantus Solostar                      11U each morning  Fiasp Flextouch                         Breakfast  1:12    Supper   1:11                                                   sscale 1U per 40>125                                                  target  mg/dl    Lower breakfast Fiasp dose by 1U on workdays to avoid workplace hypoglycemia  Change dosing routine for meals with predictable quantity (Such as morning muffin or breakfast sandwich), dose Fiasp premeal  Consider using the InsulinCalculator i[place]ogi merry as insulin dose calculator, discussed  If premeal sensor glucose at low end of range, reduce the Fiasp dose by 1-2 units  Continue use of the Freestyle sensor, download the Libre2 and Libre3 apps   Discussed use of the iYogi merry for Mary use   Will send new Libre3 CGM sensor Rx to pharmacy  Goal target premeal glucose 80- 150 mg/dl  We have discussed hypoglycemia management, use of glucose tabs or jelly bean snack  Needs repeat lab tests in 3/2024   Lab orders placed  Continue the losartan daily dose  Continue current simvastatin lipid medication   Advise having fasting lipid panel testing and dilated eye examination at least annually     Check with PCP regarding ideal iron supplement dosing  Addressed patient questions today    There are no Patient Instructions on file for this visit.    Future labs ordered today:   Orders Placed This Encounter   Procedures    GLUCOSE MONITOR, 72 HOUR, PHYS INTERP    Hemoglobin A1c    Basic metabolic panel    TSH with free T4 reflex     Radiology/Consults ordered today: None    Total time spent on day of encounter:  23 min    Follow-up:  3/19/24 Return    TI Lowe MD, MS  Endocrinology  [facility]    CC:  CHUY Melendez                   (M6) obeys commands

## 2023-12-21 ENCOUNTER — APPOINTMENT (OUTPATIENT)
Dept: HEART AND VASCULAR | Facility: CLINIC | Age: 83
End: 2023-12-21
Payer: MEDICARE

## 2023-12-21 VITALS
OXYGEN SATURATION: 98 % | WEIGHT: 205 LBS | HEIGHT: 68 IN | HEART RATE: 63 BPM | TEMPERATURE: 97 F | BODY MASS INDEX: 31.07 KG/M2 | SYSTOLIC BLOOD PRESSURE: 110 MMHG | DIASTOLIC BLOOD PRESSURE: 58 MMHG

## 2023-12-21 DIAGNOSIS — F41.9 ANXIETY DISORDER, UNSPECIFIED: ICD-10-CM

## 2023-12-21 DIAGNOSIS — R05.9 COUGH, UNSPECIFIED: ICD-10-CM

## 2023-12-21 DIAGNOSIS — I67.1 CEREBRAL ANEURYSM, NONRUPTURED: ICD-10-CM

## 2023-12-21 DIAGNOSIS — R42 DIZZINESS AND GIDDINESS: ICD-10-CM

## 2023-12-21 DIAGNOSIS — Z01.818 ENCOUNTER FOR OTHER PREPROCEDURAL EXAMINATION: ICD-10-CM

## 2023-12-21 PROCEDURE — 93000 ELECTROCARDIOGRAM COMPLETE: CPT

## 2023-12-21 PROCEDURE — 99214 OFFICE O/P EST MOD 30 MIN: CPT

## 2023-12-21 PROCEDURE — 36415 COLL VENOUS BLD VENIPUNCTURE: CPT

## 2023-12-26 RX ORDER — AZITHROMYCIN 250 MG/1
250 TABLET, FILM COATED ORAL
Qty: 1 | Refills: 0 | Status: ACTIVE | COMMUNITY
Start: 2023-04-14 | End: 1900-01-01

## 2023-12-27 LAB
ALBUMIN SERPL ELPH-MCNC: 4.4 G/DL
ALP BLD-CCNC: 88 U/L
ALT SERPL-CCNC: 15 U/L
ANION GAP SERPL CALC-SCNC: 13 MMOL/L
APTT BLD: 29.2 SEC
AST SERPL-CCNC: 15 U/L
BASOPHILS # BLD AUTO: 0.02 K/UL
BASOPHILS NFR BLD AUTO: 0.3 %
BILIRUB SERPL-MCNC: 0.6 MG/DL
BUN SERPL-MCNC: 21 MG/DL
CALCIUM SERPL-MCNC: 9.4 MG/DL
CANCER AG19-9 SERPL-ACNC: 6 U/ML
CANCER AG27-29 SERPL-ACNC: 14.6 U/ML
CEA SERPL-MCNC: 2.1 NG/ML
CHLORIDE SERPL-SCNC: 104 MMOL/L
CHOLEST SERPL-MCNC: 142 MG/DL
CK SERPL-CCNC: 105 U/L
CO2 SERPL-SCNC: 24 MMOL/L
CREAT SERPL-MCNC: 1.52 MG/DL
CRP SERPL-MCNC: 7 MG/L
EGFR: 45 ML/MIN/1.73M2
EOSINOPHIL # BLD AUTO: 0.14 K/UL
EOSINOPHIL NFR BLD AUTO: 2.4 %
ERYTHROCYTE [SEDIMENTATION RATE] IN BLOOD BY WESTERGREN METHOD: 27 MM/HR
ESTIMATED AVERAGE GLUCOSE: 108 MG/DL
FERRITIN SERPL-MCNC: 293 NG/ML
GLUCOSE SERPL-MCNC: 111 MG/DL
HBA1C MFR BLD HPLC: 5.4 %
HCT VFR BLD CALC: 42.7 %
HDLC SERPL-MCNC: 38 MG/DL
HGB BLD-MCNC: 13.6 G/DL
IMM GRANULOCYTES NFR BLD AUTO: 0.7 %
INR PPP: 0.89 RATIO
LDLC SERPL CALC-MCNC: 76 MG/DL
LYMPHOCYTES # BLD AUTO: 1.68 K/UL
LYMPHOCYTES NFR BLD AUTO: 28.7 %
MAN DIFF?: NORMAL
MCHC RBC-ENTMCNC: 31.1 PG
MCHC RBC-ENTMCNC: 31.9 GM/DL
MCV RBC AUTO: 97.7 FL
MONOCYTES # BLD AUTO: 0.43 K/UL
MONOCYTES NFR BLD AUTO: 7.3 %
NEUTROPHILS # BLD AUTO: 3.55 K/UL
NEUTROPHILS NFR BLD AUTO: 60.6 %
NONHDLC SERPL-MCNC: 104 MG/DL
PLATELET # BLD AUTO: 165 K/UL
POTASSIUM SERPL-SCNC: 4.5 MMOL/L
PROT SERPL-MCNC: 6.7 G/DL
PT BLD: 10.1 SEC
RBC # BLD: 4.37 M/UL
RBC # FLD: 13 %
SODIUM SERPL-SCNC: 141 MMOL/L
TRIGL SERPL-MCNC: 161 MG/DL
TSH SERPL-ACNC: 2.07 UIU/ML
VIT B12 SERPL-MCNC: 355 PG/ML
WBC # FLD AUTO: 5.86 K/UL

## 2023-12-28 NOTE — ED PROVIDER NOTE - NSICDXPASTMEDICALHX_GEN_ALL_CORE_FT
PAST MEDICAL HISTORY:  CAD (coronary artery disease)     HLD (hyperlipidemia)     [place] (hard of hearing)     HTN (hypertension)     Occipital neuralgia      PAST MEDICAL HISTORY:  CAD (coronary artery disease)     History of abdominal aortic aneurysm (AAA)     HLD (hyperlipidemia)     [place] (hard of hearing)     HTN (hypertension)     Occipital neuralgia      20

## 2024-01-19 NOTE — PHYSICAL EXAM
[Well Developed] : well developed [Well Nourished] : well nourished [No Acute Distress] : no acute distress [Normal Conjunctiva] : normal conjunctiva [Normal Venous Pressure] : normal venous pressure [No Carotid Bruit] : no carotid bruit [Normal S1, S2] : normal S1, S2 [No Murmur] : no murmur [No Rub] : no rub [No Gallop] : no gallop [Clear Lung Fields] : clear lung fields [Good Air Entry] : good air entry [No Respiratory Distress] : no respiratory distress  [Soft] : abdomen soft [Non Tender] : non-tender [No Masses/organomegaly] : no masses/organomegaly [Normal Bowel Sounds] : normal bowel sounds [Normal Gait] : normal gait [No Edema] : no edema [No Cyanosis] : no cyanosis [No Clubbing] : no clubbing [No Varicosities] : no varicosities [No Rash] : no rash [No Skin Lesions] : no skin lesions [Moves all extremities] : moves all extremities [No Focal Deficits] : no focal deficits [Normal Speech] : normal speech [Alert and Oriented] : alert and oriented [Normal memory] : normal memory [de-identified] : slight raise area of left posterior skull

## 2024-01-19 NOTE — HISTORY OF PRESENT ILLNESS
[FreeTextEntry1] : pt with dyspnea pt has hx of asbestosis exposure followed with yearly ct scans dr levy pt with aaa 4.5  pt with htn on losartan/hctz/metopolol pt has dysphagia pt with fatigue

## 2024-01-19 NOTE — ADDENDUM
[FreeTextEntry1] : pt says he is unable to sleep and would like a sleeping pill he has been using tylenol pm and diazepam  will give a week trial of triazolm that he has used in the past but informed him he cannot use valium and reccomend a sleep doctor or a neuropsychiatrist

## 2024-01-19 NOTE — PHYSICAL EXAM
[Well Developed] : well developed [Well Nourished] : well nourished [No Acute Distress] : no acute distress [Normal Conjunctiva] : normal conjunctiva [Normal Venous Pressure] : normal venous pressure [No Carotid Bruit] : no carotid bruit [Normal S1, S2] : normal S1, S2 [No Murmur] : no murmur [No Rub] : no rub [No Gallop] : no gallop [Clear Lung Fields] : clear lung fields [Good Air Entry] : good air entry [No Respiratory Distress] : no respiratory distress  [Soft] : abdomen soft [Non Tender] : non-tender [No Masses/organomegaly] : no masses/organomegaly [Normal Bowel Sounds] : normal bowel sounds [Normal Gait] : normal gait [No Edema] : no edema [No Cyanosis] : no cyanosis [No Clubbing] : no clubbing [No Varicosities] : no varicosities [No Rash] : no rash [No Skin Lesions] : no skin lesions [Moves all extremities] : moves all extremities [No Focal Deficits] : no focal deficits [Normal Speech] : normal speech [Alert and Oriented] : alert and oriented [Normal memory] : normal memory [de-identified] : slight raise area of left posterior skull

## 2024-01-19 NOTE — ASSESSMENT
[FreeTextEntry1] : pt with eelvated creactive protein/esr cea and ca 19.9 negative  aaa schedule f/u echocardiogram htn consider d/c of losartan dysphagia refer to gi for endoscopy Patient is cleared for surgery with blood pressure,ekg and o2 sat monitoring  patient had uri will repeat creactive protein and esr in 4/6 weeks

## 2024-02-15 ENCOUNTER — APPOINTMENT (OUTPATIENT)
Dept: HEART AND VASCULAR | Facility: CLINIC | Age: 84
End: 2024-02-15

## 2024-02-16 DIAGNOSIS — N31.9 NEUROMUSCULAR DYSFUNCTION OF BLADDER, UNSPECIFIED: ICD-10-CM

## 2024-02-16 RX ORDER — MIRABEGRON 25 MG/1
25 TABLET, FILM COATED, EXTENDED RELEASE ORAL
Refills: 0 | Status: DISCONTINUED | COMMUNITY
End: 2024-02-16

## 2024-02-16 RX ORDER — OXYBUTYNIN CHLORIDE 10 MG/1
10 TABLET, EXTENDED RELEASE ORAL DAILY
Qty: 90 | Refills: 0 | Status: DISCONTINUED | COMMUNITY
Start: 2023-11-25 | End: 2024-02-16

## 2024-03-25 RX ORDER — ATORVASTATIN CALCIUM 40 MG/1
40 TABLET, FILM COATED ORAL
Qty: 90 | Refills: 3 | Status: ACTIVE | COMMUNITY
Start: 1900-01-01 | End: 1900-01-01

## 2024-03-25 RX ORDER — LOSARTAN POTASSIUM 25 MG/1
25 TABLET, FILM COATED ORAL
Qty: 90 | Refills: 3 | Status: ACTIVE | COMMUNITY
Start: 2021-09-17 | End: 1900-01-01

## 2024-03-25 RX ORDER — METOPROLOL TARTRATE 25 MG/1
25 TABLET, FILM COATED ORAL
Qty: 180 | Refills: 3 | Status: ACTIVE | COMMUNITY
Start: 2021-11-19 | End: 1900-01-01

## 2024-04-09 RX ORDER — TROSPIUM CHLORIDE 60 MG/1
60 CAPSULE, EXTENDED RELEASE ORAL
Qty: 30 | Refills: 0 | Status: ACTIVE | COMMUNITY
Start: 2024-02-16 | End: 1900-01-01

## 2024-04-29 RX ORDER — BUPROPION HYDROCHLORIDE 150 MG/1
150 TABLET, FILM COATED, EXTENDED RELEASE ORAL DAILY
Qty: 90 | Refills: 3 | Status: ACTIVE | COMMUNITY
Start: 2023-05-31 | End: 1900-01-01

## 2024-06-04 RX ORDER — DIAZEPAM 5 MG/1
5 TABLET ORAL DAILY
Qty: 30 | Refills: 2 | Status: ACTIVE | COMMUNITY
Start: 2023-03-28 | End: 1900-01-01

## 2024-08-13 ENCOUNTER — NON-APPOINTMENT (OUTPATIENT)
Age: 84
End: 2024-08-13

## 2024-08-13 ENCOUNTER — APPOINTMENT (OUTPATIENT)
Dept: HEART AND VASCULAR | Facility: CLINIC | Age: 84
End: 2024-08-13
Payer: MEDICARE

## 2024-08-13 VITALS
OXYGEN SATURATION: 97 % | WEIGHT: 202 LBS | SYSTOLIC BLOOD PRESSURE: 125 MMHG | BODY MASS INDEX: 30.62 KG/M2 | HEART RATE: 70 BPM | HEIGHT: 68 IN | DIASTOLIC BLOOD PRESSURE: 73 MMHG

## 2024-08-13 DIAGNOSIS — R05.9 COUGH, UNSPECIFIED: ICD-10-CM

## 2024-08-13 DIAGNOSIS — F41.9 ANXIETY DISORDER, UNSPECIFIED: ICD-10-CM

## 2024-08-13 DIAGNOSIS — R42 DIZZINESS AND GIDDINESS: ICD-10-CM

## 2024-08-13 DIAGNOSIS — G47.00 INSOMNIA, UNSPECIFIED: ICD-10-CM

## 2024-08-13 DIAGNOSIS — I10 ESSENTIAL (PRIMARY) HYPERTENSION: ICD-10-CM

## 2024-08-13 PROCEDURE — G2211 COMPLEX E/M VISIT ADD ON: CPT

## 2024-08-13 PROCEDURE — 36415 COLL VENOUS BLD VENIPUNCTURE: CPT

## 2024-08-13 PROCEDURE — 93306 TTE W/DOPPLER COMPLETE: CPT

## 2024-08-13 PROCEDURE — 99214 OFFICE O/P EST MOD 30 MIN: CPT

## 2024-08-13 PROCEDURE — 93000 ELECTROCARDIOGRAM COMPLETE: CPT

## 2024-08-14 PROBLEM — R42 VERTIGO: Status: ACTIVE | Noted: 2024-08-14

## 2024-08-14 LAB
ALBUMIN SERPL ELPH-MCNC: 4.4 G/DL
ALP BLD-CCNC: 70 U/L
ALT SERPL-CCNC: 17 U/L
ANION GAP SERPL CALC-SCNC: 12 MMOL/L
AST SERPL-CCNC: 15 U/L
BASOPHILS # BLD AUTO: 0.02 K/UL
BASOPHILS NFR BLD AUTO: 0.3 %
BILIRUB SERPL-MCNC: 0.5 MG/DL
BUN SERPL-MCNC: 24 MG/DL
CALCIUM SERPL-MCNC: 9.6 MG/DL
CHLORIDE SERPL-SCNC: 107 MMOL/L
CHOLEST SERPL-MCNC: 139 MG/DL
CK SERPL-CCNC: 95 U/L
CO2 SERPL-SCNC: 23 MMOL/L
CREAT SERPL-MCNC: 1.69 MG/DL
EGFR: 39 ML/MIN/1.73M2
EOSINOPHIL # BLD AUTO: 0.11 K/UL
EOSINOPHIL NFR BLD AUTO: 1.5 %
ESTIMATED AVERAGE GLUCOSE: 114 MG/DL
FERRITIN SERPL-MCNC: 270 NG/ML
GLUCOSE SERPL-MCNC: 98 MG/DL
HBA1C MFR BLD HPLC: 5.6 %
HCT VFR BLD CALC: 44.5 %
HDLC SERPL-MCNC: 48 MG/DL
HGB BLD-MCNC: 14.3 G/DL
IMM GRANULOCYTES NFR BLD AUTO: 0.6 %
LDLC SERPL CALC-MCNC: 76 MG/DL
LYMPHOCYTES # BLD AUTO: 1.98 K/UL
LYMPHOCYTES NFR BLD AUTO: 27.7 %
MAN DIFF?: NORMAL
MCHC RBC-ENTMCNC: 31.4 PG
MCHC RBC-ENTMCNC: 32.1 GM/DL
MCV RBC AUTO: 97.6 FL
MONOCYTES # BLD AUTO: 0.61 K/UL
MONOCYTES NFR BLD AUTO: 8.5 %
NEUTROPHILS # BLD AUTO: 4.39 K/UL
NEUTROPHILS NFR BLD AUTO: 61.4 %
NONHDLC SERPL-MCNC: 91 MG/DL
PLATELET # BLD AUTO: 148 K/UL
POTASSIUM SERPL-SCNC: 4.5 MMOL/L
PROT SERPL-MCNC: 6.6 G/DL
RBC # BLD: 4.56 M/UL
RBC # FLD: 13.2 %
SODIUM SERPL-SCNC: 142 MMOL/L
TRIGL SERPL-MCNC: 78 MG/DL
TSH SERPL-ACNC: 1.92 UIU/ML
VIT B12 SERPL-MCNC: 317 PG/ML
WBC # FLD AUTO: 7.15 K/UL

## 2024-08-14 NOTE — ASSESSMENT
[FreeTextEntry1] :  cea and ca 19.9 negative  aaa  htn on metoprolol tartrate 25mg po bid losartan 25mg   FERREIRA echocardiogram nl lv fx no severe valvular disease  pt will have ct scan of lungs next weel labs reveal low normal b12 will give b12 next visit EKG obtained to assist in diagnosis and management of assessed problems. 
24

## 2024-08-14 NOTE — PHYSICAL EXAM
[Well Developed] : well developed [Well Nourished] : well nourished [No Acute Distress] : no acute distress [Normal Conjunctiva] : normal conjunctiva [Normal Venous Pressure] : normal venous pressure [No Carotid Bruit] : no carotid bruit [Normal S1, S2] : normal S1, S2 [No Murmur] : no murmur [No Rub] : no rub [No Gallop] : no gallop [Clear Lung Fields] : clear lung fields [Good Air Entry] : good air entry [No Respiratory Distress] : no respiratory distress  [Soft] : abdomen soft [Non Tender] : non-tender [No Masses/organomegaly] : no masses/organomegaly [Normal Bowel Sounds] : normal bowel sounds [Normal Gait] : normal gait [No Edema] : no edema [No Cyanosis] : no cyanosis [No Clubbing] : no clubbing [No Varicosities] : no varicosities [No Rash] : no rash [No Skin Lesions] : no skin lesions [Moves all extremities] : moves all extremities [No Focal Deficits] : no focal deficits [Normal Speech] : normal speech [Alert and Oriented] : alert and oriented [Normal memory] : normal memory [de-identified] : slight raise area of left posterior skull

## 2024-09-04 ENCOUNTER — APPOINTMENT (OUTPATIENT)
Dept: HEART AND VASCULAR | Facility: CLINIC | Age: 84
End: 2024-09-04

## 2024-09-10 ENCOUNTER — APPOINTMENT (OUTPATIENT)
Dept: HEART AND VASCULAR | Facility: CLINIC | Age: 84
End: 2024-09-10

## 2024-09-10 ENCOUNTER — INPATIENT (INPATIENT)
Facility: HOSPITAL | Age: 84
LOS: 0 days | Discharge: HOME | End: 2024-09-11
Attending: INTERNAL MEDICINE | Admitting: INTERNAL MEDICINE
Payer: COMMERCIAL

## 2024-09-10 ENCOUNTER — APPOINTMENT (OUTPATIENT)
Dept: HEART AND VASCULAR | Facility: CLINIC | Age: 84
End: 2024-09-10
Payer: MEDICARE

## 2024-09-10 VITALS
OXYGEN SATURATION: 97 % | SYSTOLIC BLOOD PRESSURE: 138 MMHG | TEMPERATURE: 98 F | RESPIRATION RATE: 18 BRPM | DIASTOLIC BLOOD PRESSURE: 67 MMHG | HEART RATE: 72 BPM

## 2024-09-10 DIAGNOSIS — J44.9 CHRONIC OBSTRUCTIVE PULMONARY DISEASE, UNSPECIFIED: ICD-10-CM

## 2024-09-10 DIAGNOSIS — I20.9 ANGINA PECTORIS, UNSPECIFIED: ICD-10-CM

## 2024-09-10 DIAGNOSIS — Z78.9 OTHER SPECIFIED HEALTH STATUS: ICD-10-CM

## 2024-09-10 DIAGNOSIS — I10 ESSENTIAL (PRIMARY) HYPERTENSION: ICD-10-CM

## 2024-09-10 DIAGNOSIS — N17.9 ACUTE KIDNEY FAILURE, UNSPECIFIED: ICD-10-CM

## 2024-09-10 LAB
ANION GAP SERPL CALC-SCNC: 9 MMOL/L — SIGNIFICANT CHANGE UP (ref 5–17)
BASOPHILS # BLD AUTO: 0.03 K/UL — SIGNIFICANT CHANGE UP (ref 0–0.2)
BASOPHILS NFR BLD AUTO: 0.4 % — SIGNIFICANT CHANGE UP (ref 0–2)
BUN SERPL-MCNC: 18 MG/DL — SIGNIFICANT CHANGE UP (ref 7–23)
CALCIUM SERPL-MCNC: 9.1 MG/DL — SIGNIFICANT CHANGE UP (ref 8.4–10.5)
CHLORIDE SERPL-SCNC: 106 MMOL/L — SIGNIFICANT CHANGE UP (ref 96–108)
CO2 SERPL-SCNC: 26 MMOL/L — SIGNIFICANT CHANGE UP (ref 22–31)
CREAT SERPL-MCNC: 1.46 MG/DL — HIGH (ref 0.5–1.3)
EGFR: 47 ML/MIN/1.73M2 — LOW
EOSINOPHIL # BLD AUTO: 0.13 K/UL — SIGNIFICANT CHANGE UP (ref 0–0.5)
EOSINOPHIL NFR BLD AUTO: 1.7 % — SIGNIFICANT CHANGE UP (ref 0–6)
GLUCOSE SERPL-MCNC: 93 MG/DL — SIGNIFICANT CHANGE UP (ref 70–99)
HCT VFR BLD CALC: 40 % — SIGNIFICANT CHANGE UP (ref 39–50)
HGB BLD-MCNC: 13.1 G/DL — SIGNIFICANT CHANGE UP (ref 13–17)
IMM GRANULOCYTES NFR BLD AUTO: 0.7 % — SIGNIFICANT CHANGE UP (ref 0–0.9)
LYMPHOCYTES # BLD AUTO: 1.87 K/UL — SIGNIFICANT CHANGE UP (ref 1–3.3)
LYMPHOCYTES # BLD AUTO: 24.3 % — SIGNIFICANT CHANGE UP (ref 13–44)
MCHC RBC-ENTMCNC: 30.6 PG — SIGNIFICANT CHANGE UP (ref 27–34)
MCHC RBC-ENTMCNC: 32.8 GM/DL — SIGNIFICANT CHANGE UP (ref 32–36)
MCV RBC AUTO: 93.5 FL — SIGNIFICANT CHANGE UP (ref 80–100)
MONOCYTES # BLD AUTO: 0.65 K/UL — SIGNIFICANT CHANGE UP (ref 0–0.9)
MONOCYTES NFR BLD AUTO: 8.5 % — SIGNIFICANT CHANGE UP (ref 2–14)
NEUTROPHILS # BLD AUTO: 4.96 K/UL — SIGNIFICANT CHANGE UP (ref 1.8–7.4)
NEUTROPHILS NFR BLD AUTO: 64.4 % — SIGNIFICANT CHANGE UP (ref 43–77)
NRBC # BLD: 0 /100 WBCS — SIGNIFICANT CHANGE UP (ref 0–0)
NT-PROBNP SERPL-SCNC: 430 PG/ML — HIGH (ref 0–300)
PLATELET # BLD AUTO: 153 K/UL — SIGNIFICANT CHANGE UP (ref 150–400)
POTASSIUM SERPL-MCNC: 4.5 MMOL/L — SIGNIFICANT CHANGE UP (ref 3.5–5.3)
POTASSIUM SERPL-SCNC: 4.5 MMOL/L — SIGNIFICANT CHANGE UP (ref 3.5–5.3)
RBC # BLD: 4.28 M/UL — SIGNIFICANT CHANGE UP (ref 4.2–5.8)
RBC # FLD: 12.6 % — SIGNIFICANT CHANGE UP (ref 10.3–14.5)
SODIUM SERPL-SCNC: 141 MMOL/L — SIGNIFICANT CHANGE UP (ref 135–145)
TROPONIN T, HIGH SENSITIVITY RESULT: 11 NG/L — SIGNIFICANT CHANGE UP (ref 0–51)
WBC # BLD: 7.69 K/UL — SIGNIFICANT CHANGE UP (ref 3.8–10.5)
WBC # FLD AUTO: 7.69 K/UL — SIGNIFICANT CHANGE UP (ref 3.8–10.5)

## 2024-09-10 PROCEDURE — 93000 ELECTROCARDIOGRAM COMPLETE: CPT

## 2024-09-10 PROCEDURE — 71045 X-RAY EXAM CHEST 1 VIEW: CPT | Mod: 26

## 2024-09-10 PROCEDURE — 99222 1ST HOSP IP/OBS MODERATE 55: CPT

## 2024-09-10 PROCEDURE — 99214 OFFICE O/P EST MOD 30 MIN: CPT

## 2024-09-10 PROCEDURE — 99285 EMERGENCY DEPT VISIT HI MDM: CPT

## 2024-09-10 PROCEDURE — 93010 ELECTROCARDIOGRAM REPORT: CPT

## 2024-09-10 PROCEDURE — G2211 COMPLEX E/M VISIT ADD ON: CPT

## 2024-09-10 RX ORDER — METOPROLOL TARTRATE 100 MG/1
25 TABLET ORAL DAILY
Refills: 0 | Status: DISCONTINUED | OUTPATIENT
Start: 2024-09-10 | End: 2024-09-11

## 2024-09-10 RX ORDER — LOSARTAN POTASSIUM 50 MG/1
25 TABLET ORAL DAILY
Refills: 0 | Status: DISCONTINUED | OUTPATIENT
Start: 2024-09-10 | End: 2024-09-10

## 2024-09-10 RX ORDER — ENOXAPARIN SODIUM 100 MG/ML
30 INJECTION SUBCUTANEOUS EVERY 24 HOURS
Refills: 0 | Status: DISCONTINUED | OUTPATIENT
Start: 2024-09-11 | End: 2024-09-11

## 2024-09-10 RX ORDER — DIAZEPAM 10 MG
5 TABLET ORAL DAILY
Refills: 0 | Status: DISCONTINUED | OUTPATIENT
Start: 2024-09-10 | End: 2024-09-11

## 2024-09-10 RX ORDER — ASPIRIN 81 MG
81 TABLET, DELAYED RELEASE (ENTERIC COATED) ORAL DAILY
Refills: 0 | Status: DISCONTINUED | OUTPATIENT
Start: 2024-09-10 | End: 2024-09-10

## 2024-09-10 RX ORDER — IPRATROPIUM BROMIDE 0.5 MG/2.5ML
500 SOLUTION RESPIRATORY (INHALATION) EVERY 6 HOURS
Refills: 0 | Status: DISCONTINUED | OUTPATIENT
Start: 2024-09-10 | End: 2024-09-11

## 2024-09-10 RX ORDER — ASPIRIN 81 MG
324 TABLET, DELAYED RELEASE (ENTERIC COATED) ORAL ONCE
Refills: 0 | Status: COMPLETED | OUTPATIENT
Start: 2024-09-10 | End: 2024-09-10

## 2024-09-10 RX ADMIN — IPRATROPIUM BROMIDE 500 MICROGRAM(S): 0.5 SOLUTION RESPIRATORY (INHALATION) at 23:31

## 2024-09-10 RX ADMIN — METOPROLOL TARTRATE 25 MILLIGRAM(S): 100 TABLET ORAL at 23:30

## 2024-09-10 RX ADMIN — Medication 5 MILLIGRAM(S): at 23:30

## 2024-09-10 RX ADMIN — Medication 324 MILLIGRAM(S): at 23:30

## 2024-09-10 RX ADMIN — Medication 40 MILLIGRAM(S): at 23:30

## 2024-09-10 NOTE — H&P ADULT - PROBLEM SELECTOR PLAN 3
Monitor BP  -continue Metoprolol XL 25mg daily  -hold Losartan 25mg daily for SANTI on CKD  -discuss further medication if warrented

## 2024-09-10 NOTE — ED PROVIDER NOTE - CARDIOVASCULAR [+], MLM
CHEST PAIN
no
Interpolation Flap Text: A decision was made to reconstruct the defect utilizing an interpolation axial flap and a staged reconstruction.  A telfa template was made of the defect.  This telfa template was then used to outline the interpolation flap.  The donor area for the pedicle flap was then injected with anesthesia.  The flap was excised through the skin and subcutaneous tissue down to the layer of the underlying musculature.  The interpolation flap was carefully excised within this deep plane to maintain its blood supply.  The edges of the donor site were undermined.   The donor site was closed in a primary fashion.  The pedicle was then rotated into position and sutured.  Once the tube was sutured into place, adequate blood supply was confirmed with blanching and refill.  The pedicle was then wrapped with xeroform gauze and dressed appropriately with a telfa and gauze bandage to ensure continued blood supply and protect the attached pedicle.

## 2024-09-10 NOTE — H&P ADULT - PROBLEM SELECTOR PLAN 2
Baseline Creatinine 1.3 (10/9/2023)  creatinine on admission 1.46, eGFR 47  -f/u urine lytes  -hold nephrotoxic agents  -hold home Losartan 25mg daily  -BMP q12hrs  -Nephrologist consult in AM

## 2024-09-10 NOTE — H&P ADULT - NSHPSOURCEINFORD_GEN_ALL_CORE
Patient Consent (Temporal Branch)/Introductory Paragraph: The rationale for Mohs was explained to the patient and consent was obtained. The risks, benefits and alternatives to therapy were discussed in detail. Specifically, the risks of damage to the temporal branch of the facial nerve, infection, scarring, pain, bleeding, prolonged wound healing, incomplete removal, allergy to anesthesia, nerve injury, recurrence and the possible need of delayed or additional reconstruction were addressed.  Prior to the procedure, the treatment site was clearly identified and confirmed by the patient. All components of Universal Protocol/PAUSE Rule completed.

## 2024-09-10 NOTE — ED ADULT NURSE NOTE - NSFALLRISKINTERV_ED_ALL_ED

## 2024-09-10 NOTE — ED PROVIDER NOTE - OBJECTIVE STATEMENT
84M PMH HTN, non-obstructive CAD, AAA, HLD, RBBB p/w midsternal CP/SOB. Has been intermittent for several months, slowly worsening. Went to cards Dr. Breen today where pt was diaphoretic, referred to ED for further eval. Pt currently feeling much better, only has minimal midsternal chest discomfort. Received 324 asa w/ EMS.    Had CT coronaries Feb 2023 w/ non-obstructive CAD. Echo performed today. Pt states that he had his yearly surveillance CT chest a few weeks ago (w/ his pulmonologist) that reportedly had no significant findings.    Denies associated nausea, vomiting, diarrhea, lightheaded, diaphoresis, palpitations, cough, rhinorrhea, black stool, bloody stool, LE pain, LE swelling, focal weakness/numbness, recent travel/immobilization, abd pain, urinary complaints, f/c.

## 2024-09-10 NOTE — H&P ADULT - HISTORY OF PRESENT ILLNESS
A&O X 4   Full Code     83 y/o male, [facility]  with  PMHx HTN, non-obstructive CAD, know RBBB,  AAA (4.3cm watched closely), HTN, HLD,  OA and Occipital Neuralgia presents to [facility] ER this evening, 9/10/24, complaining of intermittent, non exertional  midsternal chest pressure associated with SOB.    In speaking with patient, he reports     Has been intermittent for several months, slowly worsening. Went to cards Dr. Breen today where pt was diaphoretic, referred to ED for further eval. Pt currently feeling much better, only has minimal midsternal chest discomfort. Received 324 asa w/ EMS.    	Had CT coronaries Feb 2023 w/ non-obstructive CAD. Echo performed today. Pt states that he had his yearly surveillance CT chest a few weeks ago (w/ his pulmonologist) that reportedly had no significant findings.     A&O X 4   Full Code     85 y/o male, former smoker,  [place]  with  PMHx HTN, non-obstructive CAD, know RBBB,  AAA (4.3cm watched closely), HTN, HLD,  OA, COPD (recently started inhalers by his Pulmonologist) and Occipital Neuralgia presents to [facility] ER this evening, 9/10/24, complaining of  worsening intermittent, exertional SOB associated with midsternal chest pressure X 4 months.      In speaking with patient, he reports experiencing exertional dyspnea after walking one block, accompanied by midsternal chest tightness, which has been ongoing for the past four months. His most recent stress test was a year ago, and a CT scan of the coronary arteries in February 2023 showed non-obstructive coronary artery disease. He does not recall the details of the previous stress test results. The patient is scheduled for a stress test and echocardiogram next week, but his symptoms have worsened progressively. Today, he experienced diaphoresis and contacted his cardiologist, Dr. Breen, who advised him to go to the [facility] Emergency Room for further evaluation. Prior to arrival, EMS administered 324 mg of ASA. The patient denies having fever, chills, headache, cough, chest pain, current shortness of breath, dizziness or lightheadedness, abdominal discomfort, or nausea, vomiting, or diarrhea.    In ER ECG reveals NSR      with RBBB and non specific ST-T wave changes, Troponin T High Sensitivity 11, , BUN/Cr 18/1.46, CXR AP no acute pathology seen, follow up official report.    VSS      A&O X 4   Full Code     83 y/o male, former smoker,  [place]  with  PMHx HTN, non-obstructive CAD, know RBBB,  AAA (4.3cm watched closely), HTN, HLD,  OA, COPD (recently started inhalers by his Pulmonologist) and Occipital Neuralgia presents to [facility] ER this evening, 9/10/24, complaining of  worsening intermittent, exertional SOB associated with midsternal chest pressure X 4 months.      In speaking with patient, he reports experiencing exertional dyspnea after walking one block, accompanied by midsternal chest tightness, which has been ongoing for the past four months. His most recent stress test was a year ago, and a CT scan of the coronary arteries in February 2023 showed non-obstructive coronary artery disease. He does not recall the details of the previous stress test results. The patient is scheduled for a stress test and echocardiogram next week, but his symptoms have worsened progressively. Today, he experienced diaphoresis and contacted his cardiologist, Dr. Breen, who advised him to go to the [facility] Emergency Room for further evaluation. Prior to arrival, EMS administered 324 mg of ASA. The patient denies having fever, chills, headache, cough, chest pain, current shortness of breath, dizziness or lightheadedness, abdominal discomfort, or nausea, vomiting, or diarrhea.    In ER ECG reveals NSR@70bpm  with RBBB and non specific ST-T wave changes, Troponin T High Sensitivity 11, , BUN/Cr 18/1.46, CXR AP no acute pathology seen, follow up official report.    VSS      A&O X 4   Full Code     85 y/o male, former smoker,  [place]  with  PMHx HTN, non-obstructive CAD, know RBBB,  AAA (4.3cm watched closely), HTN, HLD,  OA, COPD (recently started inhalers by his Pulmonologist) and Occipital Neuralgia presents to [facility] ER this evening, 9/10/24, complaining of  worsening intermittent, exertional SOB associated with midsternal chest pressure X 4 months.      In speaking with patient, he reports experiencing exertional dyspnea after walking one block, accompanied by midsternal chest tightness, which has been ongoing for the past four months. His most recent stress test was a year ago, and a CT scan of the coronary arteries in February 2023 showed non-obstructive coronary artery disease. He does not recall the details of the previous stress test results. The patient is scheduled for a stress test and echocardiogram next week, but his symptoms have worsened progressively. Today, he experienced diaphoresis and contacted his cardiologist, Dr. Breen, who advised him to go to the [facility] Emergency Room for further evaluation. Prior to arrival, EMS administered 324 mg of ASA. The patient denies having fever, chills, headache, cough, chest pain, current shortness of breath, dizziness or lightheadedness, abdominal discomfort, or nausea, vomiting, or diarrhea.    In ER ECG reveals NSR@70bpm  with RBBB and non specific ST-T wave changes, Troponin T High Sensitivity 11, , BUN/Cr 18/1.46 eGFR 47, CXR AP no acute pathology seen, follow up official report.    VSS 97.9F; /67; HR 72; RR 18; O2 RA 97%

## 2024-09-10 NOTE — H&P ADULT - ASSESSMENT
83 y/o male, former smoker,  [place]  with  PMHx HTN, non-obstructive CAD, know RBBB,  AAA (4.3cm watched closely), HTN, HLD,  OA, COPD (recently started inhalers by his Pulmonologist) CKD stage lll (creatinine baseline 1.3 10/2023)  and Occipital Neuralgia presents to [facility] ER this evening, 9/10/24, complaining of  worsening intermittent, exertional SOB associated with midsternal chest pressure X 4 months.

## 2024-09-10 NOTE — H&P ADULT - NSHPPHYSICALEXAM_GEN_ALL_CORE
T(C): 36.6 (09-10-24 @ 17:29), Max: 36.6 (09-10-24 @ 17:29)  HR: 58 (09-10-24 @ 20:44) (58 - 72)  BP: 147/79 (09-10-24 @ 20:44) (134/68 - 147/79)  RR: 18 (09-10-24 @ 20:44) (18 - 18)  SpO2: 97% (09-10-24 @ 20:44) (97% - 97%)  Wt(kg): --    Appearance: NAD  HEENT:   Normal oral mucosa, EOMI	  Neck: Supple, - JVD; No Carotid Bruit and 2+ pulses B/L  Cardiovascular: Normal S1 S2, No murmurs  Respiratory: Lungs clear to auscultation/No Rales, Rhonchi, Wheezing	  Gastrointestinal:  Soft, Non-tender, + BS	  Skin: No rashes, No ecchymoses, No cyanosis  Extremities: Normal range of motion, No clubbing, cyanosis or edema  Vascular: Femoral pulses 2+ b/l without bruit, DP 1+ b/l, PT 1+ b/l  Neurologic: Non-focal  Psychiatry: A & O x 4, Mood & affect appropriate

## 2024-09-10 NOTE — PATIENT PROFILE ADULT - FALL HARM RISK - HARM RISK INTERVENTIONS

## 2024-09-10 NOTE — ASSESSMENT
[FreeTextEntry1] :  cea and ca 19.9 negative  aaa 4.0 htn on metoprolol tartrate 25mg po bid losartan 25mg   FERREIRA echocardiogram nl lv fx no severe valvular disease 8/2024 pt had ct of lungs that showed labs reveal low normal b12 will give b12 next visit EKG obtained to assist in diagnosis and management of assessed problems.  pt had a cta 2023 nl left main mild non obstructive disease  Pt having chest pain that lasts hours please r/o mi and dyspnea pt was also diaphoretic

## 2024-09-10 NOTE — ED PROVIDER NOTE - PROGRESS NOTE DETAILS
[place]: Cr 1.46, trop 11, , other labs grossly wnl. Remains well appearing. d/w , requesting gen cards admission for further cardiac eval. Updated pt.

## 2024-09-10 NOTE — ED PROVIDER NOTE - CLINICAL SUMMARY MEDICAL DECISION MAKING FREE TEXT BOX
84M PMH HTN, non-obstructive CAD, AAA, HLD, RBBB p/w midsternal CP/SOB. Has been intermittent for several months, slowly worsening. Went to cards Dr. Breen today where pt was diaphoretic, referred to ED for further eval. Pt currently feeling much better, only has minimal midsternal chest discomfort. Received 324 asa w/ EMS.    Had CT coronaries Feb 2023 w/ non-obstructive CAD. Echo performed today. Pt states that he had his yearly surveillance CT chest a few weeks ago (w/ his pulmonologist) that reportedly had no significant findings.    Vitals wnl, exam as above.   EKG RBBB.  ddx: Unclear etiology. Low suspicion ACS or PE.   Clinically not tamponade, dissection, PTX, perf, myocarditis, mediastinitis.   Labs, CXR, d/w Dr. Breen, reassess.

## 2024-09-10 NOTE — H&P ADULT - PROBLEM SELECTOR PLAN 1
Pt. reports FERREIRA w/ midsternal chest tightness when ambulating one block.  ECG reveals NSR@70bpm with RBBB and non specific ST-T wave changes.  Troponin T High Sensitivity 11.  Pt. received ASA by EMS 324mg PO X1  -ECG, serial CE  -TTE in AM  -NPO for further cardiac workup; discuss in AM

## 2024-09-10 NOTE — H&P ADULT - NSHPLABSRESULTS_GEN_ALL_CORE
13.1   7.69  )-----------( 153      ( 10 Sep 2024 18:28 )             40.0       09-10    141  |  106  |  18  ----------------------------<  93  4.5   |  26  |  1.46<H>    Ca    9.1      10 Sep 2024 18:28                  Urinalysis Basic - ( 10 Sep 2024 18:28 )    Color: x / Appearance: x / SG: x / pH: x  Gluc: 93 mg/dL / Ketone: x  / Bili: x / Urobili: x   Blood: x / Protein: x / Nitrite: x   Leuk Esterase: x / RBC: x / WBC x   Sq Epi: x / Non Sq Epi: x / Bacteria: x        EKG: 13.1   7.69  )-----------( 153      ( 10 Sep 2024 18:28 )             40.0       09-10    141  |  106  |  18  ----------------------------<  93  4.5   |  26  |  1.46<H>    Ca    9.1      10 Sep 2024 18:28                  Urinalysis Basic - ( 10 Sep 2024 18:28 )    Color: x / Appearance: x / SG: x / pH: x  Gluc: 93 mg/dL / Ketone: x  / Bili: x / Urobili: x   Blood: x / Protein: x / Nitrite: x   Leuk Esterase: x / RBC: x / WBC x   Sq Epi: x / Non Sq Epi: x / Bacteria: x        EKG: NSR@70bpm with RBBB and non specific ST-T wave changes

## 2024-09-10 NOTE — H&P ADULT - NSICDXPASTMEDICALHX_GEN_ALL_CORE_FT
PAST MEDICAL HISTORY:  CAD (coronary artery disease)     History of abdominal aortic aneurysm (AAA)     HLD (hyperlipidemia)     [place] (hard of hearing)     HTN (hypertension)     Occipital neuralgia

## 2024-09-10 NOTE — PHYSICAL EXAM
[Well Developed] : well developed [Well Nourished] : well nourished [No Acute Distress] : no acute distress [Normal Conjunctiva] : normal conjunctiva [Normal Venous Pressure] : normal venous pressure [No Carotid Bruit] : no carotid bruit [Normal S1, S2] : normal S1, S2 [No Murmur] : no murmur [No Rub] : no rub [No Gallop] : no gallop [Clear Lung Fields] : clear lung fields [Good Air Entry] : good air entry [No Respiratory Distress] : no respiratory distress  [Soft] : abdomen soft [Non Tender] : non-tender [No Masses/organomegaly] : no masses/organomegaly [Normal Bowel Sounds] : normal bowel sounds [Normal Gait] : normal gait [No Edema] : no edema [No Cyanosis] : no cyanosis [No Clubbing] : no clubbing [No Varicosities] : no varicosities [No Rash] : no rash [No Skin Lesions] : no skin lesions [Moves all extremities] : moves all extremities [No Focal Deficits] : no focal deficits [Normal Speech] : normal speech [Alert and Oriented] : alert and oriented [Normal memory] : normal memory [de-identified] : slight raise area of left posterior skull

## 2024-09-10 NOTE — HISTORY OF PRESENT ILLNESS
[FreeTextEntry1] : pt with dyspnea pt has hx of asbestosis exposure followed with yearly ct scans dr levy pt with aaa 4.5  pt with htn on losartan/hctz/metopolol pt has dysphagia pt with fatigue pt felt this morning like he couldn"t do anything physical pt felt short of breath broke out into a sweat Pt has had chest pain for hours

## 2024-09-10 NOTE — ED PROVIDER NOTE - CONSTITUTIONAL MENTATION
Patient called reporting a change in insurance.     Provided number to cardiology scheduling ([phone]) where they are able to place his insurance in the chart.     When that is updated, writer will notify Yamileth.   
awake/alert/oriented to person, place, time/situation

## 2024-09-10 NOTE — H&P ADULT - NS ATTEND AMEND GEN_ALL_CORE FT
83 y/o male, former smoker,  [place]  with  PMHx HTN, non-obstructive CAD, know RBBB,  AAA (4.3cm watched closely), HTN, HLD,  OA, COPD (recently started inhalers by his Pulmonologist) CKD stage lll (creatinine baseline 1.3 10/2023)  and Occipital Neuralgia presents to [facility] ER this evening, 9/10/24, complaining of  worsening intermittent, exertional SOB associated with midsternal chest pressure X 4 months.      1. shortness of breath/chest pain  Unclear etiology, hx of nonobstructive CAD, recent work up with outpatient pulmonologist with CXR, CT, PFTs reportedly normal. EKG without acute ischemic changes, negative troponin x 2, nuclear stress test for further evaluation. Recent echo at his cardiologist.    During non face-to-face time, I reviewed relevant portions of the patient’s medical record; including vitals, labs, medications, cardiac studies, remaining additional imaging and consultant recommendations. During face-to-face time, I took a relevant history and examined the patient. I also explained to the patient their diagnoses, and specific cardiopulmonary management plan, which required a high level of medical decision making.  I answered all questions related to the patient's medical conditions.

## 2024-09-10 NOTE — H&P ADULT - NSHPREVIEWOFSYSTEMS_GEN_ALL_CORE
GENERAL, CONSTITUTIONAL : denies recent weight loss, fever, chills  EYES, VISION: denies changes in vision   EARS, NOSE, THROAT: denies hearing loss  HEART, CARDIOVASCULAR: admits to midsternal  chest pain, denies arrhythmia, palpitations  RESPIRATORY: Denies cough, SOB, wheezing, PND, orthopnea  GASTROINTESTINAL: Denies abdominal pain, heartburn, bloody stool, dark tarry stool  GENITOURINARY: Denies frequent urination, urgency  MUSCULOSKELETAL admits to  joint pain or swelling,, musculoskeletal pain.   SKIN & INTEGUMENTARY Denies rashes, sores, blisters, blisters, growths.  NEUROLOGICAL: admits to  numbness or tingling sensations, sensation loss, burning.   PSYCHIATRIC: Denies nervousness, anxiety, depression  ENDOCRINE Denies heat or cold intolerance, excessive thirst  HEMATOLOGIC/LYMPHATIC: Denies abnormal bleeding, bleeding of any kind GENERAL, CONSTITUTIONAL : denies recent weight loss, fever, chills  EYES, VISION: denies changes in vision   EARS, NOSE, THROAT: admits to  hearing loss  HEART, CARDIOVASCULAR: admits to midsternal  chest pain, denies arrhythmia, palpitations  RESPIRATORY: Denies cough, SOB, wheezing, PND, orthopnea  GASTROINTESTINAL: Denies abdominal pain, heartburn, bloody stool, dark tarry stool  GENITOURINARY: Denies frequent urination, urgency  MUSCULOSKELETAL admits to  joint pain or swelling,, musculoskeletal pain.   SKIN & INTEGUMENTARY Denies rashes, sores, blisters, blisters, growths.  NEUROLOGICAL: admits to  numbness or tingling sensations, sensation loss, burning.   PSYCHIATRIC: Denies nervousness, anxiety, depression  ENDOCRINE Denies heat or cold intolerance, excessive thirst  HEMATOLOGIC/LYMPHATIC: Denies abnormal bleeding, bleeding of any kind

## 2024-09-10 NOTE — ED ADULT NURSE NOTE - OBJECTIVE STATEMENT
85 yo male c/o CP, SOB x3 mo, worsened this AM around 11 am. Reports 6 mo ago he had an episode of dizziness, has been worked up but has not been diagnosed with anything, but has been "off-kilter" since. Reports today CP was 8/10 and became very SOB, went to his [facility] cardiologist who called him an ambulance to the ED for furter w/up. Endorses dizziness x3 mo. Denies headache, cough, fever/chills, or any other complaints. AAOx4, NAD.

## 2024-09-11 ENCOUNTER — TRANSCRIPTION ENCOUNTER (OUTPATIENT)
Age: 84
End: 2024-09-11

## 2024-09-11 VITALS
SYSTOLIC BLOOD PRESSURE: 128 MMHG | OXYGEN SATURATION: 97 % | RESPIRATION RATE: 18 BRPM | TEMPERATURE: 98 F | DIASTOLIC BLOOD PRESSURE: 67 MMHG | HEART RATE: 60 BPM

## 2024-09-11 LAB
A1C WITH ESTIMATED AVERAGE GLUCOSE RESULT: 5.4 % — SIGNIFICANT CHANGE UP (ref 4–5.6)
ALBUMIN SERPL ELPH-MCNC: 4 G/DL — SIGNIFICANT CHANGE UP (ref 3.3–5)
ALP SERPL-CCNC: 65 U/L — SIGNIFICANT CHANGE UP (ref 40–120)
ALT FLD-CCNC: 13 U/L — SIGNIFICANT CHANGE UP (ref 10–45)
ANION GAP SERPL CALC-SCNC: 8 MMOL/L — SIGNIFICANT CHANGE UP (ref 5–17)
APTT BLD: 28.2 SEC — SIGNIFICANT CHANGE UP (ref 24.5–35.6)
AST SERPL-CCNC: 14 U/L — SIGNIFICANT CHANGE UP (ref 10–40)
BASOPHILS # BLD AUTO: 0.02 K/UL — SIGNIFICANT CHANGE UP (ref 0–0.2)
BASOPHILS NFR BLD AUTO: 0.3 % — SIGNIFICANT CHANGE UP (ref 0–2)
BILIRUB DIRECT SERPL-MCNC: <0.2 MG/DL — SIGNIFICANT CHANGE UP (ref 0–0.3)
BILIRUB INDIRECT FLD-MCNC: SIGNIFICANT CHANGE UP (ref 0.2–1)
BILIRUB SERPL-MCNC: 0.6 MG/DL — SIGNIFICANT CHANGE UP (ref 0.2–1.2)
BUN SERPL-MCNC: 20 MG/DL — SIGNIFICANT CHANGE UP (ref 7–23)
CALCIUM SERPL-MCNC: 9.2 MG/DL — SIGNIFICANT CHANGE UP (ref 8.4–10.5)
CHLORIDE SERPL-SCNC: 105 MMOL/L — SIGNIFICANT CHANGE UP (ref 96–108)
CHOLEST SERPL-MCNC: 127 MG/DL — SIGNIFICANT CHANGE UP
CK MB CFR SERPL CALC: 1.3 NG/ML — SIGNIFICANT CHANGE UP (ref 0–6.7)
CK SERPL-CCNC: 96 U/L — SIGNIFICANT CHANGE UP (ref 30–200)
CO2 SERPL-SCNC: 26 MMOL/L — SIGNIFICANT CHANGE UP (ref 22–31)
CREAT SERPL-MCNC: 1.53 MG/DL — HIGH (ref 0.5–1.3)
EGFR: 44 ML/MIN/1.73M2 — LOW
EOSINOPHIL # BLD AUTO: 0.12 K/UL — SIGNIFICANT CHANGE UP (ref 0–0.5)
EOSINOPHIL NFR BLD AUTO: 1.9 % — SIGNIFICANT CHANGE UP (ref 0–6)
ESTIMATED AVERAGE GLUCOSE: 108 MG/DL — SIGNIFICANT CHANGE UP (ref 68–114)
FOLATE SERPL-MCNC: 8.8 NG/ML — SIGNIFICANT CHANGE UP
GLUCOSE SERPL-MCNC: 102 MG/DL — HIGH (ref 70–99)
HCT VFR BLD CALC: 38.2 % — LOW (ref 39–50)
HDLC SERPL-MCNC: 40 MG/DL — LOW
HGB BLD-MCNC: 12.6 G/DL — LOW (ref 13–17)
IMM GRANULOCYTES NFR BLD AUTO: 0.6 % — SIGNIFICANT CHANGE UP (ref 0–0.9)
INR BLD: 0.97 — SIGNIFICANT CHANGE UP (ref 0.85–1.18)
LIPID PNL WITH DIRECT LDL SERPL: 68 MG/DL — SIGNIFICANT CHANGE UP
LYMPHOCYTES # BLD AUTO: 1.69 K/UL — SIGNIFICANT CHANGE UP (ref 1–3.3)
LYMPHOCYTES # BLD AUTO: 26.4 % — SIGNIFICANT CHANGE UP (ref 13–44)
MAGNESIUM SERPL-MCNC: 2.1 MG/DL — SIGNIFICANT CHANGE UP (ref 1.6–2.6)
MCHC RBC-ENTMCNC: 30.6 PG — SIGNIFICANT CHANGE UP (ref 27–34)
MCHC RBC-ENTMCNC: 33 GM/DL — SIGNIFICANT CHANGE UP (ref 32–36)
MCV RBC AUTO: 92.7 FL — SIGNIFICANT CHANGE UP (ref 80–100)
MONOCYTES # BLD AUTO: 0.55 K/UL — SIGNIFICANT CHANGE UP (ref 0–0.9)
MONOCYTES NFR BLD AUTO: 8.6 % — SIGNIFICANT CHANGE UP (ref 2–14)
NEUTROPHILS # BLD AUTO: 3.99 K/UL — SIGNIFICANT CHANGE UP (ref 1.8–7.4)
NEUTROPHILS NFR BLD AUTO: 62.2 % — SIGNIFICANT CHANGE UP (ref 43–77)
NON HDL CHOLESTEROL: 87 MG/DL — SIGNIFICANT CHANGE UP
NRBC # BLD: 0 /100 WBCS — SIGNIFICANT CHANGE UP (ref 0–0)
PHOSPHATE SERPL-MCNC: 3.7 MG/DL — SIGNIFICANT CHANGE UP (ref 2.5–4.5)
PLATELET # BLD AUTO: 145 K/UL — LOW (ref 150–400)
POTASSIUM SERPL-MCNC: 4 MMOL/L — SIGNIFICANT CHANGE UP (ref 3.5–5.3)
POTASSIUM SERPL-SCNC: 4 MMOL/L — SIGNIFICANT CHANGE UP (ref 3.5–5.3)
PROT SERPL-MCNC: 6.2 G/DL — SIGNIFICANT CHANGE UP (ref 6–8.3)
PROTHROM AB SERPL-ACNC: 11.1 SEC — SIGNIFICANT CHANGE UP (ref 9.5–13)
RBC # BLD: 4.12 M/UL — LOW (ref 4.2–5.8)
RBC # FLD: 12.4 % — SIGNIFICANT CHANGE UP (ref 10.3–14.5)
SODIUM SERPL-SCNC: 139 MMOL/L — SIGNIFICANT CHANGE UP (ref 135–145)
TRIGL SERPL-MCNC: 98 MG/DL — SIGNIFICANT CHANGE UP
TROPONIN T, HIGH SENSITIVITY RESULT: 14 NG/L — SIGNIFICANT CHANGE UP (ref 0–51)
TSH SERPL-MCNC: 3.55 UIU/ML — SIGNIFICANT CHANGE UP (ref 0.27–4.2)
VIT B12 SERPL-MCNC: 328 PG/ML — SIGNIFICANT CHANGE UP (ref 232–1245)
WBC # BLD: 6.41 K/UL — SIGNIFICANT CHANGE UP (ref 3.8–10.5)
WBC # FLD AUTO: 6.41 K/UL — SIGNIFICANT CHANGE UP (ref 3.8–10.5)

## 2024-09-11 PROCEDURE — 93016 CV STRESS TEST SUPVJ ONLY: CPT

## 2024-09-11 PROCEDURE — 94640 AIRWAY INHALATION TREATMENT: CPT

## 2024-09-11 PROCEDURE — 83036 HEMOGLOBIN GLYCOSYLATED A1C: CPT

## 2024-09-11 PROCEDURE — 80061 LIPID PANEL: CPT

## 2024-09-11 PROCEDURE — 85025 COMPLETE CBC W/AUTO DIFF WBC: CPT

## 2024-09-11 PROCEDURE — 80048 BASIC METABOLIC PNL TOTAL CA: CPT

## 2024-09-11 PROCEDURE — A9505: CPT

## 2024-09-11 PROCEDURE — 83880 ASSAY OF NATRIURETIC PEPTIDE: CPT

## 2024-09-11 PROCEDURE — 78451 HT MUSCLE IMAGE SPECT SING: CPT | Mod: 26

## 2024-09-11 PROCEDURE — 36415 COLL VENOUS BLD VENIPUNCTURE: CPT

## 2024-09-11 PROCEDURE — 85610 PROTHROMBIN TIME: CPT

## 2024-09-11 PROCEDURE — 82550 ASSAY OF CK (CPK): CPT

## 2024-09-11 PROCEDURE — 93005 ELECTROCARDIOGRAM TRACING: CPT

## 2024-09-11 PROCEDURE — 84443 ASSAY THYROID STIM HORMONE: CPT

## 2024-09-11 PROCEDURE — 85730 THROMBOPLASTIN TIME PARTIAL: CPT

## 2024-09-11 PROCEDURE — 82248 BILIRUBIN DIRECT: CPT

## 2024-09-11 PROCEDURE — 82607 VITAMIN B-12: CPT

## 2024-09-11 PROCEDURE — 82553 CREATINE MB FRACTION: CPT

## 2024-09-11 PROCEDURE — 71045 X-RAY EXAM CHEST 1 VIEW: CPT

## 2024-09-11 PROCEDURE — 93018 CV STRESS TEST I&R ONLY: CPT

## 2024-09-11 PROCEDURE — 99285 EMERGENCY DEPT VISIT HI MDM: CPT

## 2024-09-11 PROCEDURE — 78451 HT MUSCLE IMAGE SPECT SING: CPT

## 2024-09-11 PROCEDURE — 80053 COMPREHEN METABOLIC PANEL: CPT

## 2024-09-11 PROCEDURE — 99239 HOSP IP/OBS DSCHRG MGMT >30: CPT

## 2024-09-11 PROCEDURE — 84100 ASSAY OF PHOSPHORUS: CPT

## 2024-09-11 PROCEDURE — 84484 ASSAY OF TROPONIN QUANT: CPT

## 2024-09-11 PROCEDURE — 82746 ASSAY OF FOLIC ACID SERUM: CPT

## 2024-09-11 PROCEDURE — 83735 ASSAY OF MAGNESIUM: CPT

## 2024-09-11 RX ORDER — LEVALBUTEROL HYDROCHLORIDE 0.63 MG/3ML
0 SOLUTION RESPIRATORY (INHALATION)
Refills: 0 | DISCHARGE

## 2024-09-11 RX ORDER — ASPIRIN 81 MG
81 TABLET, DELAYED RELEASE (ENTERIC COATED) ORAL DAILY
Refills: 0 | Status: DISCONTINUED | OUTPATIENT
Start: 2024-09-11 | End: 2024-09-11

## 2024-09-11 RX ORDER — METOPROLOL TARTRATE 100 MG/1
1 TABLET ORAL
Refills: 0 | DISCHARGE

## 2024-09-11 RX ORDER — TROSPIUM CHLORIDE 60 MG/1
1 CAPSULE, EXTENDED RELEASE ORAL
Refills: 0 | DISCHARGE

## 2024-09-11 RX ORDER — UMECLIDINIUM BROMIDE AND VILANTEROL TRIFENATATE 62.5; 25 UG/1; UG/1
1 POWDER RESPIRATORY (INHALATION)
Refills: 0 | DISCHARGE

## 2024-09-11 RX ORDER — FLU VACCINE TS 2012-2013(5YR+) 45MCG/.5ML
0.5 VIAL (ML) INTRAMUSCULAR ONCE
Refills: 0 | Status: DISCONTINUED | OUTPATIENT
Start: 2024-09-11 | End: 2024-09-11

## 2024-09-11 RX ORDER — BUPROPION HYDROCHLORIDE 150 MG/1
1 TABLET ORAL
Refills: 0 | DISCHARGE

## 2024-09-11 RX ADMIN — METOPROLOL TARTRATE 25 MILLIGRAM(S): 100 TABLET ORAL at 05:37

## 2024-09-11 RX ADMIN — ENOXAPARIN SODIUM 30 MILLIGRAM(S): 100 INJECTION SUBCUTANEOUS at 17:57

## 2024-09-11 RX ADMIN — Medication 81 MILLIGRAM(S): at 17:57

## 2024-09-11 RX ADMIN — Medication 5 MILLIGRAM(S): at 17:56

## 2024-09-11 NOTE — PROGRESS NOTE ADULT - SUBJECTIVE AND OBJECTIVE BOX
Cardiology PA Adult Progress Note    SUBJECTIVE ASSESSMENT:  	  MEDICATIONS:  metoprolol succinate ER 25 milliGRAM(s) Oral daily      ipratropium    for Nebulization 500 MICROGram(s) Nebulizer every 6 hours PRN    diazepam    Tablet 5 milliGRAM(s) Oral daily      atorvastatin 40 milliGRAM(s) Oral at bedtime    enoxaparin Injectable 30 milliGRAM(s) SubCutaneous every 24 hours  influenza  Vaccine (HIGH DOSE) 0.5 milliLiter(s) IntraMuscular once    	  VITAL SIGNS:  T(C): 36.9 (09-11-24 @ 05:42), Max: 36.9 (09-11-24 @ 05:42)  HR: 70 (09-11-24 @ 05:42) (58 - 81)  BP: 138/76 (09-11-24 @ 05:42) (134/68 - 189/81)  RR: 17 (09-11-24 @ 05:42) (17 - 18)  SpO2: 98% (09-11-24 @ 05:42) (96% - 98%)  Wt(kg): --    I&O's Summary    10 Sep 2024 07:01  -  11 Sep 2024 07:00  --------------------------------------------------------  IN: 0 mL / OUT: 700 mL / NET: -700 mL    11 Sep 2024 07:01  -  11 Sep 2024 07:45  --------------------------------------------------------  IN: 0 mL / OUT: 450 mL / NET: -450 mL                                           PHYSICAL EXAM:  Appearance: Normal	  HEENT: Normal oral mucosa, PERRL, EOMI	  Neck: Supple, + JVD/ - JVD; ___ Carotid Bruit   Cardiovascular: Normal S1 S2, No murmurs  Respiratory: Lungs clear to auscultation/Decreased Breath Sounds/No Rales, Rhonchi, Wheezing	  Gastrointestinal:  Soft, Non-tender, + BS	  Skin: No rashes, No ecchymoses, No cyanosis  Extremities: Normal range of motion, No clubbing, cyanosis or edema  Vascular: Peripheral pulses palpable 2+ bilaterally  Neurologic: Non-focal  Psychiatry: A & O x 3, Mood & affect appropriate    LABS:	 	                                  13.1   7.69  )-----------( 153      ( 10 Sep 2024 18:28 )             40.0     09-10    141  |  106  |  18  ----------------------------<  93  4.5   |  26  |  1.46<H>    Ca    9.1      10 Sep 2024 18:28      proBNP:   Lipid Profile:   HgA1c:   TSH:

## 2024-09-11 NOTE — DISCHARGE NOTE NURSING/CASE MANAGEMENT/SOCIAL WORK - PATIENT PORTAL LINK FT
You can access the FollowMyHealth Patient Portal offered by [facility] by registering at the following website: [email or web address]. By joining [facility]’s FollowMyHealth portal, you will also be able to view your health information using other applications (apps) compatible with our system.

## 2024-09-11 NOTE — DISCHARGE NOTE NURSING/CASE MANAGEMENT/SOCIAL WORK - NSDCPEFALRISK_GEN_ALL_CORE
For information on Fall & Injury Prevention, visit: [email or web address] OR  [email or web address] OR  https://www.cdc.gov/steadi/patient.html

## 2024-09-11 NOTE — DISCHARGE NOTE PROVIDER - NSDCMRMEDTOKEN_GEN_ALL_CORE_FT
atorvastatin 40 mg oral tablet: 1 tab(s) orally  diazePAM 5 mg oral tablet: 1 tab(s) orally  Levalbuterol:   losartan 25 mg oral tablet: 1 tab(s) orally  metoprolol succinate 25 mg oral tablet, extended release: 1 tab(s) orally  Myrbetriq 25 mg oral tablet, extended release: 1 tab(s) orally   Anoro Ellipta 62.5 mcg-25 mcg/inh inhalation powder: 1 puff(s) inhaled once a day  atorvastatin 40 mg oral tablet: 1 tab(s) orally once a day  Buproban 150 mg/12 hours oral tablet, extended release: 1 tab(s) orally once a day  diazePAM 5 mg oral tablet: 1 tab(s) orally once a day  losartan 25 mg oral tablet: 1 tab(s) orally  metoprolol succinate 25 mg oral tablet, extended release: 1 tab(s) orally once a day  trospium 60 mg oral capsule, extended release: 1 cap(s) orally once a day

## 2024-09-11 NOTE — DISCHARGE NOTE PROVIDER - CARE PROVIDER_API CALL
Laurita Breen Iris  Cardiovascular Disease  [address], [address]  [place], [place] [address]  Phone: [phone]  Fax: [phone]  Established Patient  Follow Up Time: 2 weeks

## 2024-09-11 NOTE — PROGRESS NOTE ADULT - ASSESSMENT
85 y/o male, former smoker,  [place]  with  PMHx HTN, non-obstructive CAD, know RBBB,  AAA (4.3cm watched closely), HTN, HLD,  OA, COPD (recently started inhalers by his Pulmonologist) CKD stage lll (creatinine baseline 1.3 10/2023)  and Occipital Neuralgia presents to [facility] ER this evening, 9/10/24, complaining of  worsening intermittent, exertional SOB associated with midsternal chest pressure X 4 months.

## 2024-09-11 NOTE — DISCHARGE NOTE PROVIDER - HOSPITAL COURSE
85 y/o male, former smoker,  [place]  with  PMHx HTN, non-obstructive CAD, know RBBB,  AAA (4.3cm watched closely), HTN, HLD,  OA, COPD (recently started inhalers by his Pulmonologist) and Occipital Neuralgia presents to [facility] ER this evening, 9/10/24, complaining of  worsening intermittent, exertional SOB associated with midsternal chest pressure X 4 months.      In speaking with patient, he reports experiencing exertional dyspnea after walking one block, accompanied by midsternal chest tightness, which has been ongoing for the past four months. His most recent stress test was a year ago, and a CT scan of the coronary arteries in February 2023 showed non-obstructive coronary artery disease. He does not recall the details of the previous stress test results. The patient is scheduled for a stress test and echocardiogram next week, but his symptoms have worsened progressively. Today, he experienced diaphoresis and contacted his cardiologist, Dr. Breen, who advised him to go to the [facility] Emergency Room for further evaluation. Prior to arrival, EMS administered 324 mg of ASA. The patient denies having fever, chills, headache, cough, chest pain, current shortness of breath, dizziness or lightheadedness, abdominal discomfort, or nausea, vomiting, or diarrhea.    In ER ECG reveals NSR@70bpm  with RBBB and non specific ST-T wave changes, Troponin T High Sensitivity 11, , BUN/Cr 18/1.46 eGFR 47, CXR AP no acute pathology seen, follow up official report.    VSS 97.9F; /67; HR 72; RR 18; O2 RA 97%      Patient had echo outpatient 8/13/24: EF 59%, aortic root at sinusus of valsalva is dilated, measuring 4.00, mild AR/KS.     Patient underwent NST today 9/11/24: Myocardial perfusion imaging is normal. No evidence of infarct or inducible ischemia. Post-stress gated wall motion analysis was performed (LVEF 73%; LVEDV 92), revealing hyperdynamic LV function. There were no segmental wall motion abnormalities. There was no evidence of ischemic dilation of the LV. The RV function appeared normal. The ECG stress test was negative for ischemia. However, there were frequent PVCs noted with stress.    Given patient's satisfactory echo outpatient, reassuring NST today, and resolution of symptoms during this hospitalization, patient is stable for discharge today with follow up outpatient with his cardiologist.     Pt seen and examined at bedside today without any complaints or events overnight, VSS, labs and telemetry reviewed and pt stable for discharge as discussed with Dr. Mckeon. Pt has received appropriate discharge instructions, including medication regimen, access site management and follow up with Dr. Breen in 1-2 weeks.

## 2024-09-11 NOTE — DISCHARGE NOTE PROVIDER - NSDCCPCAREPLAN_GEN_ALL_CORE_FT
PRINCIPAL DISCHARGE DIAGNOSIS  Diagnosis: Chest pain  Assessment and Plan of Treatment: You presented with chest pain. Your blood work did not show any concerning findings of heart issue. You also underwent stress test here which did not show any concern for any heart blockages. Please continue to follow with your cardiologist outpatient in 1-2 weeks for continued management and care.      SECONDARY DISCHARGE DIAGNOSES  Diagnosis: PVC (premature ventricular contraction)  Assessment and Plan of Treatment: You were noted to have some PVCs which basically means some extra heart beats from the bottom chamber of your heart while your hospitalization here. These extra heart beats in itself are not concerning or worrisome. You are already on Metoprolol 25mg once a day which you will continue. Please follow up with your outpatient cardiologist for further care and management.    Diagnosis: HTN (hypertension)  Assessment and Plan of Treatment: Please continue your BP regiment as listed to keep your blood pressure controlled. For blood pressure that is too high or too low please see your doctor or go to the emergency room as necessary.      Diagnosis: HLD (hyperlipidemia)  Assessment and Plan of Treatment: Please continue atorvasttain 40mg once a day at bedtime to keep your cholesterol low. High cholesterol contributes to heart disease.

## 2024-09-11 NOTE — PROGRESS NOTE ADULT - NS ATTEND AMEND GEN_ALL_CORE FT
83 y/o male, former smoker,  [place]  with  PMHx HTN, non-obstructive CAD, know RBBB,  AAA (4.3cm watched closely), HTN, HLD,  OA, COPD (recently started inhalers by his Pulmonologist) CKD stage lll (creatinine baseline 1.3 10/2023)  and Occipital Neuralgia presents to [facility] ER this evening, 9/10/24, complaining of  worsening intermittent, exertional SOB associated with midsternal chest pressure X 4 months.      1. shortness of breath/chest pain  Unclear etiology, hx of nonobstructive CAD, recent work up with outpatient pulmonologist with CXR, CT, PFTs reportedly normal. EKG without acute ischemic changes, negative troponin x 2, nuclear stress test for further evaluation. Recent echo at his cardiologist.    During non face-to-face time, I reviewed relevant portions of the patient’s medical record; including vitals, labs, medications, cardiac studies, remaining additional imaging and consultant recommendations. During face-to-face time, I took a relevant history and examined the patient. I also explained to the patient their diagnoses, and specific cardiopulmonary management plan, which required a high level of medical decision making.  I answered all questions related to the patient's medical conditions.

## 2024-09-17 DIAGNOSIS — I71.40 ABDOMINAL AORTIC ANEURYSM, WITHOUT RUPTURE, UNSPECIFIED: ICD-10-CM

## 2024-09-17 DIAGNOSIS — M54.81 OCCIPITAL NEURALGIA: ICD-10-CM

## 2024-09-17 DIAGNOSIS — R07.89 OTHER CHEST PAIN: ICD-10-CM

## 2024-09-17 DIAGNOSIS — N18.30 CHRONIC KIDNEY DISEASE, STAGE 3 UNSPECIFIED: ICD-10-CM

## 2024-09-17 DIAGNOSIS — I45.10 UNSPECIFIED RIGHT BUNDLE-BRANCH BLOCK: ICD-10-CM

## 2024-09-17 DIAGNOSIS — N17.9 ACUTE KIDNEY FAILURE, UNSPECIFIED: ICD-10-CM

## 2024-09-17 DIAGNOSIS — I12.9 HYPERTENSIVE CHRONIC KIDNEY DISEASE WITH STAGE 1 THROUGH STAGE 4 CHRONIC KIDNEY DISEASE, OR UNSPECIFIED CHRONIC KIDNEY DISEASE: ICD-10-CM

## 2024-09-17 DIAGNOSIS — H91.90 UNSPECIFIED HEARING LOSS, UNSPECIFIED EAR: ICD-10-CM

## 2024-09-17 DIAGNOSIS — I49.3 VENTRICULAR PREMATURE DEPOLARIZATION: ICD-10-CM

## 2024-09-17 DIAGNOSIS — I25.10 ATHEROSCLEROTIC HEART DISEASE OF NATIVE CORONARY ARTERY WITHOUT ANGINA PECTORIS: ICD-10-CM

## 2024-09-17 DIAGNOSIS — J44.9 CHRONIC OBSTRUCTIVE PULMONARY DISEASE, UNSPECIFIED: ICD-10-CM

## 2024-09-17 DIAGNOSIS — E78.5 HYPERLIPIDEMIA, UNSPECIFIED: ICD-10-CM

## 2024-10-08 DIAGNOSIS — R42 DIZZINESS AND GIDDINESS: ICD-10-CM

## 2024-10-08 DIAGNOSIS — I10 ESSENTIAL (PRIMARY) HYPERTENSION: ICD-10-CM

## 2024-10-08 DIAGNOSIS — U07.1 COVID-19: ICD-10-CM

## 2024-10-08 DIAGNOSIS — I67.1 CEREBRAL ANEURYSM, NONRUPTURED: ICD-10-CM

## 2024-10-11 RX ORDER — SILDENAFIL CITRATE 50 MG/1
50 TABLET, FILM COATED ORAL
Qty: 6 | Refills: 0 | Status: ACTIVE | COMMUNITY
Start: 2024-10-08 | End: 1900-01-01

## 2025-02-25 ENCOUNTER — APPOINTMENT (OUTPATIENT)
Dept: UROLOGY | Facility: CLINIC | Age: 85
End: 2025-02-25
Payer: MEDICARE

## 2025-02-25 PROCEDURE — 99203 OFFICE O/P NEW LOW 30 MIN: CPT

## 2025-03-04 ENCOUNTER — EMERGENCY (EMERGENCY)
Facility: HOSPITAL | Age: 85
LOS: 1 days | Discharge: HOME | End: 2025-03-04
Attending: EMERGENCY MEDICINE | Admitting: EMERGENCY MEDICINE
Payer: MEDICARE

## 2025-03-04 VITALS
HEART RATE: 53 BPM | TEMPERATURE: 98 F | SYSTOLIC BLOOD PRESSURE: 141 MMHG | DIASTOLIC BLOOD PRESSURE: 70 MMHG | RESPIRATION RATE: 17 BRPM | WEIGHT: 210.1 LBS | OXYGEN SATURATION: 96 % | HEIGHT: 68 IN

## 2025-03-04 VITALS — HEART RATE: 70 BPM

## 2025-03-04 LAB
ANION GAP SERPL CALC-SCNC: 11 MMOL/L — SIGNIFICANT CHANGE UP (ref 5–17)
APPEARANCE UR: CLEAR — SIGNIFICANT CHANGE UP
BASOPHILS # BLD AUTO: 0.02 K/UL — SIGNIFICANT CHANGE UP (ref 0–0.2)
BASOPHILS NFR BLD AUTO: 0.3 % — SIGNIFICANT CHANGE UP (ref 0–2)
BILIRUB UR-MCNC: NEGATIVE — SIGNIFICANT CHANGE UP
BUN SERPL-MCNC: 26 MG/DL — HIGH (ref 7–23)
CALCIUM SERPL-MCNC: 9.4 MG/DL — SIGNIFICANT CHANGE UP (ref 8.4–10.5)
CHLORIDE SERPL-SCNC: 106 MMOL/L — SIGNIFICANT CHANGE UP (ref 96–108)
CO2 SERPL-SCNC: 25 MMOL/L — SIGNIFICANT CHANGE UP (ref 22–31)
COLOR SPEC: YELLOW — SIGNIFICANT CHANGE UP
CREAT SERPL-MCNC: 1.58 MG/DL — HIGH (ref 0.5–1.3)
DIFF PNL FLD: ABNORMAL
EGFR: 43 ML/MIN/1.73M2 — LOW
EOSINOPHIL # BLD AUTO: 0.11 K/UL — SIGNIFICANT CHANGE UP (ref 0–0.5)
EOSINOPHIL NFR BLD AUTO: 1.7 % — SIGNIFICANT CHANGE UP (ref 0–6)
FLUAV AG NPH QL: SIGNIFICANT CHANGE UP
FLUBV AG NPH QL: SIGNIFICANT CHANGE UP
GLUCOSE SERPL-MCNC: 100 MG/DL — HIGH (ref 70–99)
GLUCOSE UR QL: NEGATIVE MG/DL — SIGNIFICANT CHANGE UP
HCT VFR BLD CALC: 41.3 % — SIGNIFICANT CHANGE UP (ref 39–50)
HGB BLD-MCNC: 13.4 G/DL — SIGNIFICANT CHANGE UP (ref 13–17)
IMM GRANULOCYTES NFR BLD AUTO: 0.3 % — SIGNIFICANT CHANGE UP (ref 0–0.9)
KETONES UR-MCNC: NEGATIVE MG/DL — SIGNIFICANT CHANGE UP
LEUKOCYTE ESTERASE UR-ACNC: ABNORMAL
LYMPHOCYTES # BLD AUTO: 1.61 K/UL — SIGNIFICANT CHANGE UP (ref 1–3.3)
LYMPHOCYTES # BLD AUTO: 24.2 % — SIGNIFICANT CHANGE UP (ref 13–44)
MCHC RBC-ENTMCNC: 30.9 PG — SIGNIFICANT CHANGE UP (ref 27–34)
MCHC RBC-ENTMCNC: 32.4 G/DL — SIGNIFICANT CHANGE UP (ref 32–36)
MCV RBC AUTO: 95.2 FL — SIGNIFICANT CHANGE UP (ref 80–100)
MONOCYTES # BLD AUTO: 0.54 K/UL — SIGNIFICANT CHANGE UP (ref 0–0.9)
MONOCYTES NFR BLD AUTO: 8.1 % — SIGNIFICANT CHANGE UP (ref 2–14)
NEUTROPHILS # BLD AUTO: 4.35 K/UL — SIGNIFICANT CHANGE UP (ref 1.8–7.4)
NEUTROPHILS NFR BLD AUTO: 65.4 % — SIGNIFICANT CHANGE UP (ref 43–77)
NITRITE UR-MCNC: NEGATIVE — SIGNIFICANT CHANGE UP
NRBC BLD AUTO-RTO: 0 /100 WBCS — SIGNIFICANT CHANGE UP (ref 0–0)
PH UR: 5.5 — SIGNIFICANT CHANGE UP (ref 5–8)
PLATELET # BLD AUTO: 189 K/UL — SIGNIFICANT CHANGE UP (ref 150–400)
POTASSIUM SERPL-MCNC: 4.5 MMOL/L — SIGNIFICANT CHANGE UP (ref 3.5–5.3)
POTASSIUM SERPL-SCNC: 4.5 MMOL/L — SIGNIFICANT CHANGE UP (ref 3.5–5.3)
PROT UR-MCNC: SIGNIFICANT CHANGE UP MG/DL
RBC # BLD: 4.34 M/UL — SIGNIFICANT CHANGE UP (ref 4.2–5.8)
RBC # FLD: 12.6 % — SIGNIFICANT CHANGE UP (ref 10.3–14.5)
RSV RNA NPH QL NAA+NON-PROBE: SIGNIFICANT CHANGE UP
SARS-COV-2 RNA SPEC QL NAA+PROBE: SIGNIFICANT CHANGE UP
SODIUM SERPL-SCNC: 142 MMOL/L — SIGNIFICANT CHANGE UP (ref 135–145)
SP GR SPEC: 1.02 — SIGNIFICANT CHANGE UP (ref 1–1.03)
UROBILINOGEN FLD QL: 0.2 MG/DL — SIGNIFICANT CHANGE UP (ref 0.2–1)
WBC # BLD: 6.65 K/UL — SIGNIFICANT CHANGE UP (ref 3.8–10.5)
WBC # FLD AUTO: 6.65 K/UL — SIGNIFICANT CHANGE UP (ref 3.8–10.5)

## 2025-03-04 PROCEDURE — 87637 SARSCOV2&INF A&B&RSV AMP PRB: CPT

## 2025-03-04 PROCEDURE — 0241U: CPT

## 2025-03-04 PROCEDURE — 99285 EMERGENCY DEPT VISIT HI MDM: CPT

## 2025-03-04 PROCEDURE — 99284 EMERGENCY DEPT VISIT MOD MDM: CPT | Mod: 25

## 2025-03-04 PROCEDURE — 85025 COMPLETE CBC W/AUTO DIFF WBC: CPT

## 2025-03-04 PROCEDURE — 36000 PLACE NEEDLE IN VEIN: CPT

## 2025-03-04 PROCEDURE — 80048 BASIC METABOLIC PNL TOTAL CA: CPT

## 2025-03-04 PROCEDURE — 81001 URINALYSIS AUTO W/SCOPE: CPT

## 2025-03-04 PROCEDURE — 70450 CT HEAD/BRAIN W/O DYE: CPT | Mod: 26

## 2025-03-04 PROCEDURE — 36415 COLL VENOUS BLD VENIPUNCTURE: CPT

## 2025-03-04 PROCEDURE — 70450 CT HEAD/BRAIN W/O DYE: CPT | Mod: MC

## 2025-03-04 RX ADMIN — Medication 1000 MILLILITER(S): at 14:50

## 2025-03-04 NOTE — ED ADULT NURSE REASSESSMENT NOTE - NS ED NURSE REASSESS COMMENT FT1
Pt pulse oximetry heart rate reading in the high 30s. Repeat EKG done and signed by MD Guadalupe. As per MD Guadalupe pt rate is 70 and is reading lower on pulse ox due to bigeminy. Pt okay for discharge as per MD guadalupe.

## 2025-03-04 NOTE — ED PROVIDER NOTE - CLINICAL SUMMARY MEDICAL DECISION MAKING FREE TEXT BOX
intermittent dizziness/ha x 3 weeks after uti diagnosis/ treatment. no longer w uti symptoms but feels lightheaded w standing. suspect dehdyration from recent infection. labs done with stable creatinine but bun increased from baseline. ct head neg for stroke / mass / bleed.   ua w wbc/rbc but no bacteria. no uti symptoms. rec f/u with urology, pmd  discussed with pmd Dr. Breen, can see in office Thrusday at 1.   return precautions discussed intermittent dizziness/ha x 3 weeks after uti diagnosis/ treatment. no longer w uti symptoms but feels lightheaded w standing. suspect dehdyration from recent infection. labs done with stable creatinine but bun increased from baseline. ct head neg for stroke / mass / bleed.   ua w wbc/rbc but no bacteria. no uti symptoms. rec f/u with urology, pmd  discussed with pmd Dr. Breen, can see in office Thrusday at 1.   return precautions discussed  at time of dc, noted to have hr 35 on pulse ox. ekg repeated and hr actually 71 with pvc's in bigeminy pattern. remains asymptomatic, k normal,  so will proceed w dc a f/u with his pmd/cardiologist in 2 days.

## 2025-03-04 NOTE — ED ADULT NURSE NOTE - OBJECTIVE STATEMENT
Pt with hx of recent UTI on antibiotics, reports 3 days of generalized headache, fatigue and weakness affecting gait, reports feeling he is "walking flat footed", denies dysuria, hematuria, fevers, or other sick sx. Neurologically intact.

## 2025-03-04 NOTE — ED ADULT TRIAGE NOTE - CHIEF COMPLAINT QUOTE
Pt presents to ED C/O HA, lightheadedness, fatigue, " flat footed walking", HA, feeling foggy x 3 days. Hx recent UTI treated with cipro at home. Denies urinary symptoms at this time. EKG in progress.

## 2025-03-04 NOTE — ED PROVIDER NOTE - PATIENT PORTAL LINK FT
You can access the FollowMyHealth Patient Portal offered by [facility] by registering at the following website: [email or web address]. By joining [facility]’s FollowMyHealth portal, you will also be able to view your health information using other applications (apps) compatible with our system.

## 2025-03-04 NOTE — ED PROVIDER NOTE - OBJECTIVE STATEMENT
history of htn, aortic aneurysm, here with intermittent headache, dizziness/lightheadedness x 3 weeks. Reports had uti treated with cipro x 2 weeks. Uti symptoms improved but feels dizzy with standing. Better laying down. Since yesterday feels he is walking flat footed. No fever/chills, n/v/d.

## 2025-03-04 NOTE — ED ADULT NURSE NOTE - NSFALLHARMRISKINTERV_ED_ALL_ED

## 2025-03-04 NOTE — ED PROVIDER NOTE - NSFOLLOWUPINSTRUCTIONS_ED_ALL_ED_FT
Please see Dr. Breen this Thursday at 1pm for followup.  Also followup with urology, call for appointment.  If you have any problems with followup, please call the ED Referral Coordinator at [phone].  Return to the ER if symptoms worsen or other concerns.    Dizziness    Dizziness can manifest as a feeling of unsteadiness or light-headedness. You may feel like you are about to faint. This condition can be caused by a number of things, including medicines, dehydration, or illness. Drink enough fluid to keep your urine clear or pale yellow. Do not drink alcohol and limit your caffeine intake. Avoid quick or sudden movements.  Rise slowly from chairs and steady yourself until you feel okay. In the morning, first sit up on the side of the bed.    SEEK IMMEDIATE MEDICAL CARE IF YOU HAVE ANY OF THE FOLLOWING SYMPTOMS: vomiting, changes in your vision or speech, weakness in your arms or legs, trouble speaking or swallowing, chest pain, abdominal pain, shortness of breath, sweating, bleeding, headache, neck pain, or fever.

## 2025-03-04 NOTE — ED PROVIDER NOTE - NSICDXPASTMEDICALHX_GEN_ALL_CORE_FT
PAST MEDICAL HISTORY:  CAD (coronary artery disease)     History of abdominal aortic aneurysm (AAA)     HLD (hyperlipidemia)     [place] (hard of hearing)     HTN (hypertension)     Occipital neuralgia

## 2025-03-06 ENCOUNTER — LABORATORY RESULT (OUTPATIENT)
Age: 85
End: 2025-03-06

## 2025-03-06 ENCOUNTER — APPOINTMENT (OUTPATIENT)
Dept: HEART AND VASCULAR | Facility: CLINIC | Age: 85
End: 2025-03-06
Payer: MEDICARE

## 2025-03-06 VITALS
SYSTOLIC BLOOD PRESSURE: 115 MMHG | TEMPERATURE: 97 F | WEIGHT: 210 LBS | BODY MASS INDEX: 31.83 KG/M2 | DIASTOLIC BLOOD PRESSURE: 52 MMHG | HEIGHT: 68 IN | HEART RATE: 62 BPM | OXYGEN SATURATION: 97 %

## 2025-03-06 DIAGNOSIS — R51.9 HEADACHE, UNSPECIFIED: ICD-10-CM

## 2025-03-06 DIAGNOSIS — N39.0 URINARY TRACT INFECTION, SITE NOT SPECIFIED: ICD-10-CM

## 2025-03-06 DIAGNOSIS — R06.09 OTHER FORMS OF DYSPNEA: ICD-10-CM

## 2025-03-06 DIAGNOSIS — I67.1 CEREBRAL ANEURYSM, NONRUPTURED: ICD-10-CM

## 2025-03-06 DIAGNOSIS — R42 DIZZINESS AND GIDDINESS: ICD-10-CM

## 2025-03-06 DIAGNOSIS — I10 ESSENTIAL (PRIMARY) HYPERTENSION: ICD-10-CM

## 2025-03-06 DIAGNOSIS — F41.9 ANXIETY DISORDER, UNSPECIFIED: ICD-10-CM

## 2025-03-06 DIAGNOSIS — N31.9 NEUROMUSCULAR DYSFUNCTION OF BLADDER, UNSPECIFIED: ICD-10-CM

## 2025-03-06 DIAGNOSIS — I49.3 VENTRICULAR PREMATURE DEPOLARIZATION: ICD-10-CM

## 2025-03-06 PROCEDURE — G2211 COMPLEX E/M VISIT ADD ON: CPT

## 2025-03-06 PROCEDURE — 93000 ELECTROCARDIOGRAM COMPLETE: CPT

## 2025-03-06 PROCEDURE — 36415 COLL VENOUS BLD VENIPUNCTURE: CPT

## 2025-03-06 PROCEDURE — 99214 OFFICE O/P EST MOD 30 MIN: CPT

## 2025-03-06 RX ORDER — METOPROLOL SUCCINATE 25 MG/1
25 TABLET, EXTENDED RELEASE ORAL
Refills: 0 | Status: ACTIVE | COMMUNITY

## 2025-03-06 RX ORDER — SERTRALINE HYDROCHLORIDE 50 MG/1
50 TABLET, FILM COATED ORAL
Refills: 0 | Status: ACTIVE | COMMUNITY

## 2025-03-07 ENCOUNTER — NON-APPOINTMENT (OUTPATIENT)
Age: 85
End: 2025-03-07

## 2025-03-07 LAB
ALBUMIN SERPL ELPH-MCNC: 4.4 G/DL
ALP BLD-CCNC: 86 U/L
ALT SERPL-CCNC: 18 U/L
ANION GAP SERPL CALC-SCNC: 11 MMOL/L
AST SERPL-CCNC: 17 U/L
BILIRUB SERPL-MCNC: 0.5 MG/DL
BUN SERPL-MCNC: 24 MG/DL
CALCIUM SERPL-MCNC: 9.4 MG/DL
CHLORIDE SERPL-SCNC: 106 MMOL/L
CHOLEST SERPL-MCNC: 120 MG/DL
CO2 SERPL-SCNC: 26 MMOL/L
CREAT SERPL-MCNC: 1.56 MG/DL
EGFRCR SERPLBLD CKD-EPI 2021: 43 ML/MIN/1.73M2
GLUCOSE SERPL-MCNC: 102 MG/DL
HCT VFR BLD CALC: 41.5 %
HDLC SERPL-MCNC: 47 MG/DL
HGB BLD-MCNC: 13.4 G/DL
LDLC SERPL CALC-MCNC: 55 MG/DL
MCHC RBC-ENTMCNC: 31 PG
MCHC RBC-ENTMCNC: 32.3 G/DL
MCV RBC AUTO: 96.1 FL
NONHDLC SERPL-MCNC: 73 MG/DL
PLATELET # BLD AUTO: 172 K/UL
POTASSIUM SERPL-SCNC: 4.6 MMOL/L
PROT SERPL-MCNC: 6.4 G/DL
PSA FREE FLD-MCNC: 11 %
PSA FREE SERPL-MCNC: 1.93 NG/ML
PSA SERPL-MCNC: 16.9 NG/ML
RBC # BLD: 4.32 M/UL
RBC # FLD: 13.2 %
SODIUM SERPL-SCNC: 143 MMOL/L
TRIGL SERPL-MCNC: 93 MG/DL
TSH SERPL-ACNC: 2.41 UIU/ML
WBC # FLD AUTO: 9.55 K/UL

## 2025-03-10 PROBLEM — N39.0 RECURRENT UTI: Status: ACTIVE | Noted: 2025-03-10

## 2025-03-10 LAB
APPEARANCE: CLEAR
BILIRUBIN URINE: NEGATIVE
BLOOD URINE: ABNORMAL
CK SERPL-CCNC: 56 U/L
COLOR: YELLOW
ESTIMATED AVERAGE GLUCOSE: 117 MG/DL
FERRITIN SERPL-MCNC: 334 NG/ML
GLUCOSE QUALITATIVE U: NEGATIVE MG/DL
HBA1C MFR BLD HPLC: 5.7 %
KETONES URINE: NEGATIVE MG/DL
LEUKOCYTE ESTERASE URINE: ABNORMAL
NITRITE URINE: NEGATIVE
PH URINE: 5
PROTEIN URINE: 30 MG/DL
SPECIFIC GRAVITY URINE: 1.02
UROBILINOGEN URINE: 0.2 MG/DL
VIT B12 SERPL-MCNC: >2000 PG/ML

## 2025-03-10 RX ORDER — AMOXICILLIN AND CLAVULANATE POTASSIUM 875; 125 MG/1; MG/1
875-125 TABLET, COATED ORAL TWICE DAILY
Qty: 14 | Refills: 1 | Status: ACTIVE | COMMUNITY
Start: 2025-03-10 | End: 1900-01-01

## 2025-03-11 ENCOUNTER — APPOINTMENT (OUTPATIENT)
Dept: UROLOGY | Facility: CLINIC | Age: 85
End: 2025-03-11
Payer: MEDICARE

## 2025-03-11 DIAGNOSIS — R31.9 HEMATURIA, UNSPECIFIED: ICD-10-CM

## 2025-03-11 PROCEDURE — 51798 US URINE CAPACITY MEASURE: CPT

## 2025-03-11 PROCEDURE — 99214 OFFICE O/P EST MOD 30 MIN: CPT

## 2025-03-17 LAB — URINE CYTOLOGY: NORMAL

## 2025-03-19 ENCOUNTER — APPOINTMENT (OUTPATIENT)
Dept: HEART AND VASCULAR | Facility: CLINIC | Age: 85
End: 2025-03-19

## 2025-03-23 ENCOUNTER — OUTPATIENT (OUTPATIENT)
Dept: OUTPATIENT SERVICES | Facility: HOSPITAL | Age: 85
LOS: 1 days | End: 2025-03-23
Payer: MEDICARE

## 2025-03-23 PROCEDURE — 74178 CT ABD&PLV WO CNTR FLWD CNTR: CPT

## 2025-03-23 PROCEDURE — 74178 CT ABD&PLV WO CNTR FLWD CNTR: CPT | Mod: 26

## 2025-03-23 PROCEDURE — 82565 ASSAY OF CREATININE: CPT

## 2025-03-25 ENCOUNTER — APPOINTMENT (OUTPATIENT)
Dept: UROLOGY | Facility: CLINIC | Age: 85
End: 2025-03-25
Payer: MEDICARE

## 2025-03-25 VITALS
DIASTOLIC BLOOD PRESSURE: 54 MMHG | HEART RATE: 60 BPM | WEIGHT: 210 LBS | TEMPERATURE: 97.2 F | OXYGEN SATURATION: 96 % | HEIGHT: 68 IN | BODY MASS INDEX: 31.83 KG/M2 | SYSTOLIC BLOOD PRESSURE: 154 MMHG

## 2025-03-25 PROCEDURE — 52000 CYSTOURETHROSCOPY: CPT

## 2025-03-27 LAB — BACTERIA UR CULT: NORMAL

## 2025-04-03 ENCOUNTER — APPOINTMENT (OUTPATIENT)
Dept: HEART AND VASCULAR | Facility: CLINIC | Age: 85
End: 2025-04-03
Payer: MEDICARE

## 2025-04-03 ENCOUNTER — APPOINTMENT (OUTPATIENT)
Dept: HEART AND VASCULAR | Facility: CLINIC | Age: 85
End: 2025-04-03

## 2025-04-03 ENCOUNTER — LABORATORY RESULT (OUTPATIENT)
Age: 85
End: 2025-04-03

## 2025-04-03 VITALS — OXYGEN SATURATION: 96 % | WEIGHT: 210 LBS | BODY MASS INDEX: 31.83 KG/M2 | HEART RATE: 58 BPM | HEIGHT: 68 IN

## 2025-04-03 DIAGNOSIS — H61.23 IMPACTED CERUMEN, BILATERAL: ICD-10-CM

## 2025-04-03 DIAGNOSIS — I67.1 CEREBRAL ANEURYSM, NONRUPTURED: ICD-10-CM

## 2025-04-03 DIAGNOSIS — Z01.810 ENCOUNTER FOR PREPROCEDURAL CARDIOVASCULAR EXAMINATION: ICD-10-CM

## 2025-04-03 DIAGNOSIS — F41.9 ANXIETY DISORDER, UNSPECIFIED: ICD-10-CM

## 2025-04-03 PROCEDURE — 99214 OFFICE O/P EST MOD 30 MIN: CPT | Mod: 25

## 2025-04-03 PROCEDURE — 93306 TTE W/DOPPLER COMPLETE: CPT

## 2025-04-03 PROCEDURE — 93015 CV STRESS TEST SUPVJ I&R: CPT

## 2025-04-04 VITALS
SYSTOLIC BLOOD PRESSURE: 155 MMHG | DIASTOLIC BLOOD PRESSURE: 71 MMHG | HEART RATE: 67 BPM | TEMPERATURE: 97 F | OXYGEN SATURATION: 97 % | HEIGHT: 68 IN | WEIGHT: 205.03 LBS | RESPIRATION RATE: 12 BRPM

## 2025-04-04 PROBLEM — Z01.810 PREOPERATIVE CARDIOVASCULAR EXAMINATION: Status: ACTIVE | Noted: 2025-04-04

## 2025-04-04 LAB
ALBUMIN SERPL ELPH-MCNC: 4.5 G/DL
ALP BLD-CCNC: 93 U/L
ALT SERPL-CCNC: 23 U/L
ANION GAP SERPL CALC-SCNC: 11 MMOL/L
APTT BLD: 32.5 SEC
AST SERPL-CCNC: 19 U/L
BASOPHILS # BLD AUTO: 0.03 K/UL
BASOPHILS NFR BLD AUTO: 0.4 %
BILIRUB SERPL-MCNC: 0.8 MG/DL
BUN SERPL-MCNC: 19 MG/DL
CALCIUM SERPL-MCNC: 9.8 MG/DL
CHLORIDE SERPL-SCNC: 105 MMOL/L
CHOLEST SERPL-MCNC: 140 MG/DL
CK SERPL-CCNC: 68 U/L
CO2 SERPL-SCNC: 27 MMOL/L
CREAT SERPL-MCNC: 1.62 MG/DL
EGFRCR SERPLBLD CKD-EPI 2021: 41 ML/MIN/1.73M2
EOSINOPHIL # BLD AUTO: 0.17 K/UL
EOSINOPHIL NFR BLD AUTO: 2.2 %
ESTIMATED AVERAGE GLUCOSE: 111 MG/DL
FERRITIN SERPL-MCNC: 222 NG/ML
GLUCOSE SERPL-MCNC: 88 MG/DL
HBA1C MFR BLD HPLC: 5.5 %
HCT VFR BLD CALC: 42.1 %
HDLC SERPL-MCNC: 52 MG/DL
HGB BLD-MCNC: 14 G/DL
IMM GRANULOCYTES NFR BLD AUTO: 0.3 %
INR PPP: 0.93 RATIO
LDLC SERPL-MCNC: 68 MG/DL
LYMPHOCYTES # BLD AUTO: 1.94 K/UL
LYMPHOCYTES NFR BLD AUTO: 25.5 %
MAN DIFF?: NORMAL
MCHC RBC-ENTMCNC: 30.6 PG
MCHC RBC-ENTMCNC: 33.3 G/DL
MCV RBC AUTO: 91.9 FL
MONOCYTES # BLD AUTO: 0.66 K/UL
MONOCYTES NFR BLD AUTO: 8.7 %
NEUTROPHILS # BLD AUTO: 4.78 K/UL
NEUTROPHILS NFR BLD AUTO: 62.9 %
NONHDLC SERPL-MCNC: 87 MG/DL
PLATELET # BLD AUTO: 187 K/UL
POTASSIUM SERPL-SCNC: 4.9 MMOL/L
PROT SERPL-MCNC: 6.9 G/DL
PT BLD: 11 SEC
RBC # BLD: 4.58 M/UL
RBC # FLD: 13.7 %
SODIUM SERPL-SCNC: 143 MMOL/L
TRIGL SERPL-MCNC: 106 MG/DL
TSH SERPL-ACNC: 4.67 UIU/ML
VIT B12 SERPL-MCNC: 441 PG/ML
WBC # FLD AUTO: 7.6 K/UL

## 2025-04-04 NOTE — ASU PATIENT PROFILE, ADULT - FALL HARM RISK - HARM RISK INTERVENTIONS

## 2025-04-04 NOTE — ASU PATIENT PROFILE, ADULT - NSICDXPASTMEDICALHX_GEN_ALL_CORE_FT
PAST MEDICAL HISTORY:  CAD (coronary artery disease)     History of abdominal aortic aneurysm (AAA)     HLD (hyperlipidemia)     [place] (hard of hearing)     HTN (hypertension)     Occipital neuralgia

## 2025-04-07 ENCOUNTER — APPOINTMENT (OUTPATIENT)
Dept: UROLOGY | Facility: HOSPITAL | Age: 85
End: 2025-04-07

## 2025-04-07 ENCOUNTER — INPATIENT (INPATIENT)
Facility: HOSPITAL | Age: 85
LOS: 0 days | Discharge: HOME | DRG: 670 | End: 2025-04-07
Attending: UROLOGY | Admitting: UROLOGY
Payer: COMMERCIAL

## 2025-04-07 ENCOUNTER — TRANSCRIPTION ENCOUNTER (OUTPATIENT)
Age: 85
End: 2025-04-07

## 2025-04-07 ENCOUNTER — RESULT REVIEW (OUTPATIENT)
Age: 85
End: 2025-04-07

## 2025-04-07 VITALS
OXYGEN SATURATION: 94 % | SYSTOLIC BLOOD PRESSURE: 136 MMHG | RESPIRATION RATE: 17 BRPM | HEART RATE: 52 BPM | DIASTOLIC BLOOD PRESSURE: 59 MMHG

## 2025-04-07 DIAGNOSIS — J44.9 CHRONIC OBSTRUCTIVE PULMONARY DISEASE, UNSPECIFIED: Chronic | ICD-10-CM

## 2025-04-07 DIAGNOSIS — F32.A DEPRESSION, UNSPECIFIED: Chronic | ICD-10-CM

## 2025-04-07 DIAGNOSIS — Z96.643 PRESENCE OF ARTIFICIAL HIP JOINT, BILATERAL: Chronic | ICD-10-CM

## 2025-04-07 DIAGNOSIS — Z98.890 OTHER SPECIFIED POSTPROCEDURAL STATES: Chronic | ICD-10-CM

## 2025-04-07 PROCEDURE — 88307 TISSUE EXAM BY PATHOLOGIST: CPT | Mod: 26

## 2025-04-07 PROCEDURE — 88307 TISSUE EXAM BY PATHOLOGIST: CPT

## 2025-04-07 PROCEDURE — 52240 CYSTOSCOPY AND TREATMENT: CPT

## 2025-04-07 RX ORDER — ONDANSETRON HCL/PF 4 MG/2 ML
4 VIAL (ML) INJECTION EVERY 6 HOURS
Refills: 0 | Status: DISCONTINUED | OUTPATIENT
Start: 2025-04-07 | End: 2025-04-07

## 2025-04-07 RX ORDER — MIRABEGRON 50 MG/1
1 TABLET, FILM COATED, EXTENDED RELEASE ORAL
Qty: 5 | Refills: 0
Start: 2025-04-07 | End: 2025-04-11

## 2025-04-07 RX ORDER — ATORVASTATIN CALCIUM 80 MG/1
40 TABLET, FILM COATED ORAL AT BEDTIME
Refills: 0 | Status: DISCONTINUED | OUTPATIENT
Start: 2025-04-07 | End: 2025-04-07

## 2025-04-07 RX ORDER — LIDOCAINE HCL/PF 10 MG/ML
5 VIAL (ML) INJECTION EVERY 6 HOURS
Refills: 0 | Status: DISCONTINUED | OUTPATIENT
Start: 2025-04-07 | End: 2025-04-07

## 2025-04-07 RX ORDER — PIPERACILLIN-TAZO-DEXTROSE,ISO 3.375G/5
3.38 IV SOLUTION, PIGGYBACK PREMIX FROZEN(ML) INTRAVENOUS ONCE
Refills: 0 | Status: DISCONTINUED | OUTPATIENT
Start: 2025-04-07 | End: 2025-04-07

## 2025-04-07 RX ORDER — METOPROLOL SUCCINATE 50 MG/1
25 TABLET, EXTENDED RELEASE ORAL DAILY
Refills: 0 | Status: DISCONTINUED | OUTPATIENT
Start: 2025-04-08 | End: 2025-04-07

## 2025-04-07 RX ORDER — SODIUM CHLORIDE 9 G/1000ML
1000 INJECTION, SOLUTION INTRAVENOUS
Refills: 0 | Status: DISCONTINUED | OUTPATIENT
Start: 2025-04-07 | End: 2025-04-07

## 2025-04-07 RX ORDER — PIPERACILLIN-TAZO-DEXTROSE,ISO 3.375G/5
3.38 IV SOLUTION, PIGGYBACK PREMIX FROZEN(ML) INTRAVENOUS EVERY 6 HOURS
Refills: 0 | Status: CANCELLED | OUTPATIENT
Start: 2025-04-08 | End: 2025-04-07

## 2025-04-07 RX ORDER — DOCUSATE SODIUM 100 MG
1 CAPSULE ORAL
Qty: 42 | Refills: 0
Start: 2025-04-07 | End: 2025-04-20

## 2025-04-07 NOTE — BRIEF OPERATIVE NOTE - NSICDXBRIEFPROCEDURE_GEN_ALL_CORE_FT
PROCEDURES:  Transurethral resection of bladder tumor (TURBT) with biopsy 07-Apr-2025 15:15:52  Santos German

## 2025-04-07 NOTE — ASU DISCHARGE PLAN (ADULT/PEDIATRIC) - MEDICATION INSTRUCTIONS
Tylenol and ibuprofen as directed on the bottle Tylenol as directed on the bottle Tylenol (acetaminophen) as directed on the bottle. None before 830pm.

## 2025-04-07 NOTE — ASU DISCHARGE PLAN (ADULT/PEDIATRIC) - ASU DC SPECIAL INSTRUCTIONSFT
TRANSURETHRAL RESECTION OF BLADDER TUMOR    GENERAL: It is common to have blood in your urine after your procedure.  It may be pink or even red; and it is important to increase fluid intake to 2-3L of water per day to keep the urine as clear as possible. Please inform your doctor if you have a significant amount of clot in the urine or if you are unable to void at all.  The urine may clear and then become bloody again especially as you are more physically active. It is not uncommon to have some burning when you urinate, this will gradually improve. If a catheter is in place, it is not uncommon to have occasional leakage or urine or blood around the catheter. Please call your urologist if this is excessive and/or the urine is not draining through the catheter into the bag.    CATHETER: Some patients are sent home with a Reveles catheter, while others go home urinating on their own. A Reveles catheter continuously drains the urine from the bladder. If you still have a catheter, the nurses will review instructions and care before you go home.    UROLOGIC MEDICATIONS:  The following medications may have been sent to your pharmacy for stent related discomfort: Flomax (tamsulosin) 0.4mg at bedtime until stent removed, Ditropan (oxybutynin) 5mg every 8 hours as needed for bladder spasms, and Pyridium (phenazopyridine) 100mg every 8 hours as needed for kidney/bladder discomfort for max 3 days (Pyridium will make your urine orange).     PAIN: You may take Tylenol (acetaminophen) 650-975mg and/or Motrin (ibuprofen) 400-600mg, both available over the counter, for pain every 6 hours as needed. Do not exceed 4000mg of Tylenol (acetaminophen) daily. You may alternate these medications such that you take one or the other every 3 hours for around the clock pain coverage.    ANTIBIOTICS: You may be given a prescription for an antibiotic, please take this medication as instructed and be sure to complete the entire course.    STOOL SOFTENERS: Do not allow yourself to become constipated as straining may cause bleeding. Take stool softeners or a laxative (ex. Miralax, Colace, Senokot, ExLax, etc), available over the counter, if needed.    ANTICOAGULATION: If you are taking any blood thinning medications, please discuss with your urologist prior to restarting these medications unless otherwise specified.    BATHING: You may shower or bathe. If going home with reveles, shower only until catheter is removed.    DIET: You may resume your regular diet and regular medication regimen.    ACTIVITY: No heavy lifting or strenuous exercise until you are evaluated at your post-operative appointment. Otherwise, you may return to your usual level of physical activity.    FOLLOW-UP: If you did not already schedule your post-operative appointment, please call your urologist to schedule and follow-up appointment.    CALL YOUR UROLOGIST IF: You have any bleeding that does not stop, inability to void >8 hours, fever over 100.4 F, chills, persistent nausea/vomiting, changes in your incision concerning for infection, or if your pain is not controlled on your discharge pain medications. TRANSURETHRAL RESECTION OF BLADDER TUMOR    GENERAL: It is common to have blood in your urine after your procedure.  It may be pink or even red; and it is important to increase fluid intake to 2-3L of water per day to keep the urine as clear as possible. Please inform your doctor if you have a significant amount of clot in the urine or if you are unable to void at all.  The urine may clear and then become bloody again especially as you are more physically active. It is not uncommon to have some burning when you urinate, this will gradually improve. If a catheter is in place, it is not uncommon to have occasional leakage or urine or blood around the catheter. Please call your urologist if this is excessive and/or the urine is not draining through the catheter into the bag.    CATHETER: Some patients are sent home with a Reveles catheter, while others go home urinating on their own. A Reveles catheter continuously drains the urine from the bladder. If you still have a catheter, the nurses will review instructions and care before you go home.    PAIN: You may take Tylenol (acetaminophen) 650-975mg available over the counter, for pain every 6 hours as needed. Do not exceed 4000mg of Tylenol (acetaminophen) daily.      STOOL SOFTENERS: Do not allow yourself to become constipated as straining may cause bleeding. Take stool softeners or a laxative (ex. Miralax, Colace, Senokot, ExLax, etc), available over the counter, if needed.    ANTICOAGULATION: If you are taking any blood thinning medications, please discuss with your urologist prior to restarting these medications unless otherwise specified.    BATHING: You may shower or bathe. If going home with reveles, shower only until catheter is removed.    DIET: You may resume your regular diet and regular medication regimen.    ACTIVITY: No heavy lifting or strenuous exercise until you are evaluated at your post-operative appointment. Otherwise, you may return to your usual level of physical activity.    FOLLOW-UP: The office will call you for an appointment. If you do not hear from them in 24-48 hours, please call the office to make a follow-up visit for next week    CALL YOUR UROLOGIST IF: You have any bleeding that does not stop, inability to void >8 hours, fever over 100.4 F, chills, persistent nausea/vomiting, changes in your incision concerning for infection, or if your pain is not controlled on your discharge pain medications. TRANSURETHRAL RESECTION OF BLADDER TUMOR    GENERAL: It is common to have blood in your urine after your procedure.  It may be pink or even red; and it is important to increase fluid intake to 2-3L of water per day to keep the urine as clear as possible. Please inform your doctor if you have a significant amount of clot in the urine or if you are unable to void at all.  The urine may clear and then become bloody again especially as you are more physically active. It is not uncommon to have some burning when you urinate, this will gradually improve. If a catheter is in place, it is not uncommon to have occasional leakage or urine or blood around the catheter. Please call your urologist if this is excessive and/or the urine is not draining through the catheter into the bag.    CATHETER: Some patients are sent home with a Reveles catheter, while others go home urinating on their own. A Reveles catheter continuously drains the urine from the bladder. If you still have a catheter, the nurses will review instructions and care before you go home.    PAIN: You may take Tylenol (acetaminophen) 650-975mg available over the counter, for pain every 6 hours as needed. Do not exceed 4000mg of Tylenol (acetaminophen) daily. You can take mirabegron 25 mg daily for bladder spasms / pressure / pain. Please stop taking mirabegron on Sunday because it can make the removal of the reveles more difficult next week.      STOOL SOFTENERS: Do not allow yourself to become constipated as straining may cause bleeding. Take stool softeners or a laxative (ex. Miralax, Colace, Senokot, ExLax, etc), available over the counter, if needed.    ANTICOAGULATION: If you are taking any blood thinning medications, please discuss with your urologist prior to restarting these medications unless otherwise specified.    BATHING: You may shower or bathe. If going home with reveles, shower only until catheter is removed.    DIET: You may resume your regular diet and regular medication regimen.    ACTIVITY: No heavy lifting or strenuous exercise until you are evaluated at your post-operative appointment. Otherwise, you may return to your usual level of physical activity.    FOLLOW-UP: The office will call you for an appointment. If you do not hear from them in 24-48 hours, please call the office to make a follow-up visit for next week    CALL YOUR UROLOGIST IF: You have any bleeding that does not stop, inability to void >8 hours, fever over 100.4 F, chills, persistent nausea/vomiting, changes in your incision concerning for infection, or if your pain is not controlled on your discharge pain medications.

## 2025-04-07 NOTE — PROGRESS NOTE ADULT - SUBJECTIVE AND OBJECTIVE BOX
UROLOGY POST OP NOTE (PAGER # [phone])    PROCEDURE: TURBT    T(C): 36.2 (04-07-25 @ 16:40), Max: 36.3 (04-07-25 @ 13:27)  HR: 52 (04-07-25 @ 17:10) (52 - 67)  BP: 126/61 (04-07-25 @ 17:10) (99/55 - 155/71)  RR: 16 (04-07-25 @ 17:10) (12 - 20)  SpO2: 93% (04-07-25 @ 17:10) (93% - 97%)  Wt(kg): --    Subjective: pain controlled. Denies CP, SOB, N, V, fevers.  ON PE: awake and alert    Abdomen: nontender, nondistended    : no suprapubic/CVAT. FC intact draining fruit punch urine              A/P:

## 2025-04-07 NOTE — ASU DISCHARGE PLAN (ADULT/PEDIATRIC) - CARE PROVIDER_API CALL
Sathish Shanks  Urology  [address] [address]  Phone: [phone]  Fax: [phone]  Follow Up Time: 1 week   Sathish Shanks  Urology  [address] [address]  Phone: [phone]  Fax: [phone]  Scheduled Appointment: 04/15/2025

## 2025-04-07 NOTE — BRIEF OPERATIVE NOTE - OPERATION/FINDINGS
history of enterococcus in urine. large >5 cm bladder tumor sessile and polypoid in nature at dome of bladder. Resection done with loop and fulguration with button. reveles placed at end of case

## 2025-04-07 NOTE — ASU DISCHARGE PLAN (ADULT/PEDIATRIC) - FINANCIAL ASSISTANCE
[facility] provides services at a reduced cost to those who are determined to be eligible through [facility]’s financial assistance program. Information regarding [facility]’s financial assistance program can be found by going to [email or web address] or by calling [phone].

## 2025-04-07 NOTE — PROGRESS NOTE ADULT - ASSESSMENT
84M hx CAD, HLD, HTN, COPD s/p TURBT 4/7.    Plan:  -monitor I's and O's   -SCD's  -OOB  -IS  -pain and nausea control  -c/w home meds  -dispo planning

## 2025-04-07 NOTE — ASU DISCHARGE PLAN (ADULT/PEDIATRIC) - PROVIDER TOKENS
PROVIDER:[TOKEN:[[id number]],FOLLOWUP:[1 week]] PROVIDER:[TOKEN:[[id number]],SCHEDULEDAPPT:[04/15/2025]]

## 2025-04-08 ENCOUNTER — NON-APPOINTMENT (OUTPATIENT)
Age: 85
End: 2025-04-08

## 2025-04-08 ENCOUNTER — APPOINTMENT (OUTPATIENT)
Dept: UROLOGY | Facility: CLINIC | Age: 85
End: 2025-04-08
Payer: MEDICARE

## 2025-04-08 ENCOUNTER — EMERGENCY (EMERGENCY)
Facility: HOSPITAL | Age: 85
LOS: 1 days | End: 2025-04-08
Attending: EMERGENCY MEDICINE | Admitting: EMERGENCY MEDICINE
Payer: MEDICARE

## 2025-04-08 VITALS
HEART RATE: 64 BPM | RESPIRATION RATE: 18 BRPM | SYSTOLIC BLOOD PRESSURE: 154 MMHG | TEMPERATURE: 98 F | DIASTOLIC BLOOD PRESSURE: 63 MMHG | OXYGEN SATURATION: 94 %

## 2025-04-08 VITALS
RESPIRATION RATE: 18 BRPM | WEIGHT: 210.1 LBS | SYSTOLIC BLOOD PRESSURE: 156 MMHG | DIASTOLIC BLOOD PRESSURE: 74 MMHG | HEIGHT: 68 IN | HEART RATE: 77 BPM | OXYGEN SATURATION: 94 %

## 2025-04-08 DIAGNOSIS — Z98.890 OTHER SPECIFIED POSTPROCEDURAL STATES: Chronic | ICD-10-CM

## 2025-04-08 DIAGNOSIS — F32.A DEPRESSION, UNSPECIFIED: Chronic | ICD-10-CM

## 2025-04-08 DIAGNOSIS — J44.9 CHRONIC OBSTRUCTIVE PULMONARY DISEASE, UNSPECIFIED: Chronic | ICD-10-CM

## 2025-04-08 DIAGNOSIS — Z96.643 PRESENCE OF ARTIFICIAL HIP JOINT, BILATERAL: Chronic | ICD-10-CM

## 2025-04-08 PROCEDURE — 99283 EMERGENCY DEPT VISIT LOW MDM: CPT | Mod: FS

## 2025-04-08 PROCEDURE — 99213 OFFICE O/P EST LOW 20 MIN: CPT

## 2025-04-08 PROCEDURE — 99282 EMERGENCY DEPT VISIT SF MDM: CPT

## 2025-04-08 RX ORDER — MIRABEGRON 50 MG/1
50 TABLET, EXTENDED RELEASE ORAL
Qty: 30 | Refills: 2 | Status: ACTIVE | COMMUNITY
Start: 2025-04-08 | End: 1900-01-01

## 2025-04-08 NOTE — ED PROVIDER NOTE - CPE EDP NEURO NORM
Quality 226: Preventive Care And Screening: Tobacco Use: Screening And Cessation Intervention: Patient screened for tobacco use and is an ex/non-smoker Quality 47: Advance Care Plan: Advance Care Planning discussed and documented; advance care plan or surrogate decision maker documented in the medical record. Detail Level: Detailed Quality 110: Preventive Care And Screening: Influenza Immunization: Influenza Immunization Administered during Influenza season Quality 111:Pneumonia Vaccination Status For Older Adults: Pneumococcal vaccine administered on or after patientâs 60th birthday and before the end of the measurement period Quality 130: Documentation Of Current Medications In The Medical Record: Current Medications Documented normal...

## 2025-04-08 NOTE — ED PROVIDER NOTE - PATIENT PORTAL LINK FT
You can access the FollowMyHealth Patient Portal offered by [facility] by registering at the following website: [email or web address]. By joining [facility]’s FollowMyHealth portal, you will also be able to view your health information using other applications (apps) compatible with our system.

## 2025-04-08 NOTE — ED ADULT NURSE NOTE - NSICDXPASTMEDICALHX_GEN_ALL_CORE_FT
PAST MEDICAL HISTORY:  CAD (coronary artery disease)     History of abdominal aortic aneurysm (AAA)     HLD (hyperlipidemia)     [place] (hard of hearing)     HTN (hypertension)     Occipital neuralgia

## 2025-04-08 NOTE — ED PROVIDER NOTE - NSICDXPASTMEDICALHX_GEN_ALL_CORE_FT
PAST MEDICAL HISTORY:  CAD (coronary artery disease)     History of abdominal aortic aneurysm (AAA)     HLD (hyperlipidemia)     [place] (hard of hearing)     HTN (hypertension)     Occipital neuralgia

## 2025-04-08 NOTE — ED ADULT NURSE NOTE - HOW OFTEN DO YOU HAVE A DRINK CONTAINING ALCOHOL?
MRN:[id number]                      After Visit Summary   4/14/2018    Eben Craig    MRN: [id number]           Thank you!     Thank you for choosing [facility] for your care. Our goal is always to provide you with excellent care. Hearing back from our patients is one way we can continue to improve our services. Please take a few minutes to complete the written survey that you may receive in the mail after you visit with us. Thank you!        Patient Information     Date Of Birth          1937        Designated Caregiver       Most Recent Value    Caregiver    Will someone help with your care after discharge? yes    Name of designated caregiver Saima    Phone number of caregiver [phone]    Caregiver address same as patient      About your hospital stay     You were admitted on:  April 14, 2018 You last received care in the:  [address]    You were discharged on:  April 17, 2018        Reason for your hospital stay       Hospitalized for suspected pneumonia and improved                  Who to Call     For medical emergencies, please call 911.  For non-urgent questions about your medical care, please call your primary care provider or clinic, [phone]          Attending Provider     Provider Specialty    Yolanda, Hebert PARKER MD Emergency Medicine    Withams, Broderick Palomares MD Family Practice    Steve Moser MD Internal Medicine       Primary Care Provider Office Phone # Fax #    Juliano Forbes [phone]      After Care Instructions     Activity       Your activity upon discharge: activity as tolerated            Diet       Follow this diet upon discharge: Orders Placed This Encounter      Room Service      Moderate Consistent CHO Diet            Monitor and record       blood glucose according to your usual home routine  weight every day            Oxygen Adult       Renew Home Oxygen Order  Renew previous prescription.  Expected treatment  length is indefinite (99 months).    Attending Provider: Steve Moser  Physician signature: See electronic signature associated with these discharge orders  Date of Order: April 17, 2018            Wound care and dressings       Instructions to care for your wound at home: as directed and daily dressing changes. Daily cares with silver alginate to the wound, Polymem pink foam with adhesive tape strips applies to areas of concern, bony prominences.  Use of the VA off loading pads to be applied around the moiz prominences of the left and right foot.                  Follow-up Appointments     Follow-up and recommended labs and tests        Follow up with primary care provider, Juliano Forbes, within 7 days for hospital follow- up and regarding new diagnosis. Recheck INR on 4/19/18.                  Your next 10 appointments already scheduled     Apr 24, 2018  3:30 PM CDT   [facility] with [facility] MD   [facility]    [address] [place] [id number] [phone]           Bring a current list of meds and any records pertaining to this visit. For Physicals, please bring immunization records and any forms needing to be filled out. Please arrive 10 minutes early to complete paperwork.            May 04, 2018 10:00 AM CDT   (Arrive by 9:45 AM)   CT CHEST W/O CONTRAST with PHCT1   [facility] CT Scan ([facility])    [address] [place] [id number] [phone]           Please bring any scans or X-rays taken at other hospitals, if similar tests were done. Also bring a list of your medicines, including vitamins, minerals and over-the-counter drugs. It is safest to leave personal items at home.  Be sure to tell your doctor:   If you have any allergies.   If there s any chance you are pregnant.   If you are breastfeeding.  You do not need to do anything special to prepare for this exam.  Please wear loose clothing,  such as a sweat suit or jogging clothes. Avoid snaps, zippers and other metal. We may ask you to undress and put on a hospital gown.            May 07, 2018 11:45 AM CDT   Return Visit with Maribel Kong MD   [facility] ([facility])    [facility]  [address]  [facility] [address]   [phone]            Aug 13, 2018 10:30 AM CDT   Return Visit with Ashutosh Hamm MD   [facility] ([facility])    [facility]  1st Floor  [address] [address]   [phone]              Additional Services     HOME CARE NURSING REFERRAL       **Order classes of: [facility], [facility] and [facility] will route to the Home Care and Hospice Referral Pool.  Home Care or Hospice will then contact the patient to schedule their appointment.**    If you do not hear from Home Care and Hospice, or you would like to call to schedule, please call the referring place of service that your provider has listed below.  ______________________________________________________________________    Your provider has referred you to: [facility]: [facility] [phone]   [email or web address]    Extended Emergency Contact Information  Primary Emergency Contact: PONCE SOUZA  Address: [address]           [place] [address] [facility]  Home Phone: [phone]  Relation: Spouse  Secondary Emergency Contact: Zachariah Victor  Address: [address] [place] [address] [facility]  Home Phone: [phone]  Mobile Phone: [phone]  Relation: Son  Hearing or visual needs: None  Other needs: None  Preferred language: English   needed? No    Patient Anticipated Discharge Date: 4/17/2018     RN, PT, HHA to begin 24 - 48 hours after discharge.  PLEASE EVALUATE AND TREAT (Evaluation timeline is 24 - 48 hrs. Please call if there  is need for a variance to this timeline).    REASON FOR REFERRAL: Assessment & Treatment: PT and RN and Wound Care - See orders    ADDITIONAL SERVICES NEEDED:     OTHER PERTINENT INFORMATION: Patient was last seen by provider on 4/17/2018 for hospital discharge.    Current Outpatient Prescriptions:  pregabalin (LYRICA) 100 MG capsule, Take 1 capsule (100 mg) by mouth 3 times daily Patient reports taking BID sometimes.  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     Hyperlipidemia LDL goal <130     History of deep venous thrombosis 2010 left leg     Anxiety     Hypertension goal BP (blood pressure) < 140/90     Long term current use of anticoagulant therapy     Advanced directives, counseling/discussion     CA - pancreatic cancer     Sepsis (H)     Pneumonia due to infectious organism     Leukocytosis     Health Care Home     CKD (chronic kidney disease) stage 3, GFR 30-59 ml/min     Neuropathy     Pulmonary nodules     History of skin cancer     Type 2 diabetes mellitus with diabetic chronic kidney disease (H)     Hypothyroidism due to acquired atrophy of thyroid     Long-term (current) use of anticoagulants [Z79.01]     Need for prophylactic measure     Thyroid nodule     Cellulitis of lower leg - left, VA foot culture positive for Pseudomonas and enterococcus     Acquired plantar keratoderma     Cataract     Charcot's joint of foot     Diabetic neuropathic arthropathy (H)     Dry eyes     Dystrophia unguium      Fracture of foot     Gastroesophageal reflux disease     History of kidney stones     Chronic respiratory failure with hypoxia (H)     Anemia associated with chemotherapy     Open wound of left foot - chronic     Immunocompromised (H) - chemo     Thrombocytopenia (H)     History of Clostridium difficile infection Dec 2017     Cardiomyopathy (H) EF 45-50% + grade II diastolic dysfunction     RVF (right ventricular failure) (H) - mild     Dilated aortic root (H) mild 4.1 cm by Echo     Inflammatory monoarthritis of wrist, left     Pneumonia     S/P splenectomy      Documentation of Face to Face and Certification for Home Health Services    I certify that patient, Eben Craig is under my care and that I, or a Nurse Practitioner or Physician's Assistant working with me, had a face-to-face encounter that meets the physician face-to-face encounter requirements with this patient on: 4/17/2018.    This encounter with the patient was in whole, or in part, for the following medical condition, which is the primary reason for Home Health Care: pneumonia.    I certify that, based on my findings, the following services are medically necessary Home Health Services: Nursing and Physical Therapy    My clinical findings support the need for the above services because: Nurse is needed: To assess vital signs and oxygenation after changes in medications or other medical regimen. and To provide caregiver training to assist with: wound care.. and Physical Therapy Services are needed to assess and treat the following functional impairments: gait abnormality and weakness.    Further, I certify that my clinical findings support that this patient is homebound (i.e. absences from home require considerable and taxing effort and are for medical reasons or [employer] services or infrequently or of short duration when for other reasons) because: Requires assistance of another person or specialized equipment to access medical services because  patient: Requires supervision of another for safe transfer..    Based on the above findings, I certify that this patient is confined to the home and needs intermittent skilled nursing care, physical therapy and/or speech therapy.  The patient is under my care, and I have initiated the establishment of the plan of care.  This patient will be followed by a physician who will periodically review the plan of care.    Physician/Provider to provide follow up care: Juliano Forbes    [facility] certified Physician at time of discharge: Steve Moser MD    Please be aware that coverage of these services is subject to the terms and limitations of your health insurance plan.  Call member services at your health plan with any benefit or coverage questions.                  Warfarin Instruction     You have started taking a medicine called warfarin. This is a blood-thinning medicine (anticoagulant). It helps prevent and treat blood clots.      Before leaving the hospital, make sure you know how much to take and how long to take it.      You will need regular blood tests to make sure your blood is clotting safely. It is very important to see your doctor for regular blood tests.    Talk to your doctor before taking any new medicine (this includes over-the-counter drugs and herbal products). Many medicines can interact with warfarin. This may cause more bleeding or too much clotting.     Eating a lot of vitamin K--found in green, leafy vegetables--can change the way warfarin works in your body. Do NOT avoid these foods. Instead, try to eat the same amount each day.     Bleeding is the most common side-effect of warfarin. You may notice bleeding gums, a bloody nose, bruises and bleeding longer when you cut yourself. See a doctor at once if:   o You cough up blood  o You find blood in your stool (poop)  o You have a deep cut, or a cut that bleeds longer than 10 minutes   o You have a bad cut, hard fall, accident or hit your  "head (go to urgent care or the emergency room).    For women who can get pregnant: This medicine can harm an unborn baby. Be very careful not to get pregnant while taking this medicine. If you think you might be pregnant, call your doctor right away.    For more information, read \"Guide to Warfarin Therapy,  the booklet you received in the hospital.        Pending Results     Date and Time Order Name Status Description    4/15/2018 1833 Gram stain Preliminary     4/15/2018 1833 Sputum Culture Aerobic Bacterial In process     4/14/2018 2100 Blood culture Preliminary     4/14/2018 1956 Blood culture Preliminary             Statement of Approval     Ordered          04/17/18 0955  I have reviewed and agree with all the recommendations and orders detailed in this document.  EFFECTIVE NOW     Approved and electronically signed by:  Steve Moser MD             Admission Information     Date & Time Provider Department Dept. Phone    4/14/2018 Steve Moser MD [address] Surgical [phone]      Your Vitals Were     Blood Pressure Pulse Temperature Respirations Height Weight    98/56 78 96  F (35.6  C) (Oral) 20 1.753 m (5' 9\") 98.1 kg (216 lb 4.3 oz)    Pulse Oximetry BMI (Body Mass Index)                92% 31.94 kg/m2          MyChart Information     [employer] gives you secure access to your electronic health record. If you see a primary care provider, you can also send messages to your care team and make appointments. If you have questions, please call your primary care clinic.  If you do not have a primary care provider, please call [phone] and they will assist you.        Care EveryWhere ID     This is your Care EveryWhere ID. This could be used by other organizations to access your [facility] medical records  [id number]        Equal Access to Services     TALIB ARAUJO : yary Salazar, chase hamilton. " So [facility] [phone].    ATENCIÓN: Si habla chava, tiene a dick disposición servicios gratuitos de asistencia lingüística. Akira al [phone].    We comply with applicable federal civil rights laws and [place] laws. We do not discriminate on the basis of race, color, national origin, age, disability, sex, sexual orientation, or gender identity.               Review of your medicines      START taking        Dose / Directions    azithromycin 250 MG tablet   Commonly known as:  ZITHROMAX   Indication:  Community Acquired Pneumonia   Used for:  Community acquired pneumonia, unspecified laterality        Dose:  250 mg   Take 1 tablet (250 mg) by mouth every evening for 2 days   Quantity:  2 tablet   Refills:  0       cefdinir 300 MG capsule   Commonly known as:  OMNICEF   Indication:  Community Acquired Pneumonia   Used for:  Community acquired pneumonia, unspecified laterality        Dose:  600 mg   Take 2 capsules (600 mg) by mouth every evening for 4 days   Quantity:  8 capsule   Refills:  0       predniSONE 20 MG tablet   Commonly known as:  DELTASONE        Dose:  20 mg   Start taking on:  4/18/2018   Take 1 tablet (20 mg) by mouth daily for 3 days   Quantity:  3 tablet   Refills:  0         CONTINUE these medicines which may have CHANGED, or have new prescriptions. If we are uncertain of the size of tablets/capsules you have at home, strength may be listed as something that might have changed.        Dose / Directions    LYRICA 100 MG capsule   This may have changed:  how much to take   Generic drug:  pregabalin        Dose:  100 mg   Take 1 capsule (100 mg) by mouth 3 times daily Patient reports taking BID sometimes.  [facility], dx:neuopathy   Quantity:  90 capsule   Refills:  0       terazosin 5 MG capsule   Commonly known as:  HYTRIN   This may have changed:  additional instructions   Used for:  Hypertrophy of prostate without urinary obstruction        Dose:  5 mg   Take 1 capsule (5 mg) by  mouth At Bedtime DO NOT RESTART UNTIL AFTER FOLLOW UP WITH PCP BECAUSE OF LOW NORMAL BLOOD PRESSURE READINGS   Quantity:  180 capsule   Refills:  2       warfarin 2 MG tablet   Commonly known as:  COUMADIN   This may have changed:  additional instructions   Used for:  Long-term (current) use of anticoagulants, Atrial fibrillation, unspecified type (H)        No dose on 4/17/18, then take 2 mg on 4/18/18 and recheck INR on 4/19/18, then take as directed by the coumadin clinc.   Quantity:  240 tablet   Refills:  0         CONTINUE these medicines which have NOT CHANGED        Dose / Directions    ACE/ARB/ARNI NOT PRESCRIBED (INTENTIONAL)   Used for:  Hypertension goal BP (blood pressure) < 140/90        Please choose reason not prescribed, below   Refills:  0       ascorbic acid 500 MG tablet   Commonly known as:  VITAMIN C        1 TABLET DAILY   Refills:  0       atorvastatin 10 MG tablet   Commonly known as:  LIPITOR   Used for:  Type 2 diabetes mellitus with stage 3 chronic kidney disease, without long-term current use of insulin (H)        TAKE ONE TABLET BY MOUTH ONCE DAILY   Quantity:  90 tablet   Refills:  3       benzonatate 100 MG capsule   Commonly known as:  TESSALON        Dose:  100 mg   Take 100 mg by mouth 3 times daily as needed for cough   Refills:  0       blood glucose calibration Normal solution   Used for:  Type 2 diabetes, HbA1C goal < 8% (H)        Dose:  1 drop   1 drop by In Vitro route as needed. Use to check each new box of test strips for accuracy.   Quantity:  1 each   Refills:  3       blood glucose monitoring lancets   Used for:  Type 2 diabetes, HbA1C goal < 8% (H)        Use to check blood glucose 1 time a day.   Quantity:  50 each   Refills:  3       blood glucose monitoring test strip   Commonly known as:  WILIAN CONTOUR NEXT        Dose:  1 strip   1 strip by In Vitro route daily Use to test blood sugar 1times daily or as directed.   Quantity:  100 strip   Refills:  3        busPIRone 10 MG tablet   Commonly known as:  BUSPAR   Used for:  Anxiety        Dose:  10 mg   Take 1 tablet (10 mg) by mouth 3 times daily as needed For anxiety   Refills:  0       citalopram 20 MG tablet   Commonly known as:  celeXA   Used for:  Anxiety        Dose:  20 mg   Take 1 tablet (20 mg) by mouth daily   Quantity:  90 tablet   Refills:  3       [employer] VITAMIN D3 1000 units Caps        Dose:  1000 Units   Take 1,000 Units by mouth daily   Quantity:  30 capsule   Refills:  0       furosemide 20 MG tablet   Commonly known as:  LASIX   Used for:  Hypertension goal BP (blood pressure) < 140/90        Dose:  20 mg   20 mg 2 times daily Take a total of 2 tablets daily   Quantity:  270 tablet   Refills:  3       glipiZIDE 5 MG 24 hr tablet   Commonly known as:  glipiZIDE XL        Dose:  5 mg   Take 1 tablet (5 mg) by mouth daily   Quantity:  90 tablet   Refills:  1       guaiFENesin-codeine 100-10 MG/5ML Soln solution   Commonly known as:  ROBITUSSIN AC        Dose:  1-2 tsp.   Take 1-2 tsp. by mouth every 4 hours as needed for cough   Refills:  0       hydrocortisone 1 % Crea cream   Commonly known as:  PROCTO-LE   Used for:  External hemorrhoids        APPLY TO RECTAL AREA TWICE DAILY AS NEEDD   Quantity:  10 g   Refills:  3       levothyroxine 100 MCG tablet   Commonly known as:  SYNTHROID/LEVOTHROID   Used for:  Hypothyroidism due to acquired atrophy of thyroid        TAKE ONE TABLET BY MOUTH ONCE DAILY   Quantity:  90 tablet   Refills:  3       LORazepam 0.5 MG tablet   Commonly known as:  ATIVAN   Used for:  Malignant neoplasm of upper lobe of left lung (H)        Dose:  0.5 mg   Take 1 tablet (0.5 mg) by mouth every 4 hours as needed (Anxiety, Nausea/Vomiting or Sleep)   Quantity:  30 tablet   Refills:  1       metolazone 5 MG tablet   Commonly known as:  ZAROXOLYN   Used for:  CKD (chronic kidney disease) stage 3, GFR 30-59 ml/min        Dose:  5 mg   Take 1 tablet (5 mg) by mouth daily as needed For  increased leg swelling   Quantity:  60 tablet   Refills:  3       MULTIVITAMINS PO        Take  by mouth.   Refills:  0       ondansetron 8 MG tablet   Commonly known as:  ZOFRAN   Used for:  Malignant neoplasm of upper lobe of left lung (H)        Dose:  8 mg   Take 1 tablet (8 mg) by mouth every 8 hours as needed (Nausea/Vomiting)   Quantity:  10 tablet   Refills:  1       order for DME   Used for:  BPH (benign prostatic hypertrophy)        use as needed prn   Quantity:  1   Refills:  0       order for DME   Used for:  Hypoxia, Pleural effusion, right        Equipment being ordered: Oxygen.  Pt to have 1-2 liters O2 via NC to use with ambulation.  Pt hypoxic to sats of 85% on RA with ambulation.   Quantity:  1 each   Refills:  0       oxyCODONE-acetaminophen 5-325 MG per tablet   Commonly known as:  PERCOCET   Used for:  Pain in joint, ankle and foot, unspecified laterality, Chronic pain of both shoulders        Dose:  1-2 tablet   Take 1-2 tablets by mouth every 6 hours as needed for pain   Quantity:  30 tablet   Refills:  0       potassium chloride SA 20 MEQ CR tablet   Commonly known as:  KLOR-CON   Used for:  Essential hypertension with goal blood pressure less than 140/90        Dose:  20 mEq   Take 1 tablet (20 mEq) by mouth 3 times daily   Quantity:  268 tablet   Refills:  1       prochlorperazine 10 MG tablet   Commonly known as:  COMPAZINE   Used for:  Malignant neoplasm of upper lobe of left lung (H)        Dose:  5 mg   Take 0.5 tablets (5 mg) by mouth every 6 hours as needed (Nausea/Vomiting)   Quantity:  30 tablet   Refills:  1       Saw Palmetto (Serenoa repens) 450 MG Caps        Dose:  450 mg   Take 450 mg by mouth daily.   Refills:  0       TYLENOL PO        Dose:  650 mg   Take 650 mg by mouth every 4 hours as needed for mild pain or fever   Refills:  0       vitamin B complex with vitamin C Tabs tablet   Commonly known as:  STRESS TAB        Dose:  1 tablet   take 1 Tab by mouth daily.    Refills:  0         STOP taking     PROCTOFOAM 1 % foam   Generic drug:  pramoxine                Where to get your medicines      These medications were sent to [facility] [address] [place] [address] - [address]  [address], [facility] [address]     Phone:  [phone]     azithromycin 250 MG tablet    cefdinir 300 MG capsule    predniSONE 20 MG tablet                Protect others around you: Learn how to safely use, store and throw away your medicines at www.disposemymeds.org.        ANTIBIOTIC INSTRUCTION     You've Been Prescribed an Antibiotic - Now What?  Your healthcare team thinks that you or your loved one might have an infection. Some infections can be treated with antibiotics, which are powerful, life-saving drugs. Like all medications, antibiotics have side effects and should only be used when necessary. There are some important things you should know about your antibiotic treatment.      Your healthcare team may run tests before you start taking an antibiotic.    Your team may take samples (e.g., from your blood, urine or other areas) to run tests to look for bacteria. These test can be important to determine if you need an antibiotic at all and, if you do, which antibiotic will work best.      Within a few days, your healthcare team might change or even stop your antibiotic.    Your team may start you on an antibiotic while they are working to find out what is making you sick.    Your team might change your antibiotic because test results show that a different antibiotic would be better to treat your infection.    In some cases, once your team has more information, they learn that you do not need an antibiotic at all. They may find out that you don't have an infection, or that the antibiotic you're taking won't work against your infection. For example, an infection caused by a virus can't be treated with antibiotics. Staying on an antibiotic when you don't need it is more likely to be harmful  than helpful.      You may experience side effects from your antibiotic.    Like all medications, antibiotics have side effects. Some of these can be serious.    Let you healthcare team know if you have any known allergies when you are admitted to the hospital.    One significant side effect of nearly all antibiotics is the risk of severe and sometimes deadly diarrhea caused by Clostridium difficile (C. Difficile). This occurs when a person takes antibiotics because some good germs are destroyed. Antibiotic use allows C. diificile to take over, putting patients at high risk for this serious infection.    As a patient or caregiver, it is important to understand your or your loved one's antibiotic treatment. It is especially important for caregivers to speak up when patients can't speak for themselves. Here are some important questions to ask your healthcare team.    What infection is this antibiotic treating and how do you know I have that infection?    What side effects might occur from this antibiotic?    How long will I need to take this antibiotic?    Is it safe to take this antibiotic with other medications or supplements (e.g., vitamins) that I am taking?     Are there any special directions I need to know about taking this antibiotic? For example, should I take it with food?    How will I be monitored to know whether my infection is responding to the antibiotic?    What tests may help to make sure the right antibiotic is prescribed for me?      Information provided by:  www.cdc.gov/getsmart  U.S. Department of Health and Human Services  Centers for disease Control and Prevention  National Center for Emerging and Zoonotic Infectious Diseases  Division of Healthcare Quality Promotion             Medication List: This is a list of all your medications and when to take them. Check marks below indicate your daily home schedule. Keep this list as a reference.      Medications           Morning Afternoon Evening  Bedtime As Needed    ACE/ARB/ARNI NOT PRESCRIBED (INTENTIONAL)   Please choose reason not prescribed, below                                ascorbic acid 500 MG tablet   Commonly known as:  VITAMIN C   1 TABLET DAILY                                atorvastatin 10 MG tablet   Commonly known as:  LIPITOR   TAKE ONE TABLET BY MOUTH ONCE DAILY   Last time this was given:  10 mg on 4/17/2018  8:07 AM                                   azithromycin 250 MG tablet   Commonly known as:  ZITHROMAX   Take 1 tablet (250 mg) by mouth every evening for 2 days   Last time this was given:  250 mg on 4/16/2018  8:20 PM                                   benzonatate 100 MG capsule   Commonly known as:  TESSALON   Take 100 mg by mouth 3 times daily as needed for cough   Last time this was given:  100 mg on 4/16/2018 11:18 PM                                   blood glucose calibration Normal solution   1 drop by In Vitro route as needed. Use to check each new box of test strips for accuracy.                                blood glucose monitoring lancets   Use to check blood glucose 1 time a day.                                blood glucose monitoring test strip   Commonly known as:  MyWants CONTOUR NEXT   1 strip by In Vitro route daily Use to test blood sugar 1times daily or as directed.                                busPIRone 10 MG tablet   Commonly known as:  BUSPAR   Take 1 tablet (10 mg) by mouth 3 times daily as needed For anxiety                                cefdinir 300 MG capsule   Commonly known as:  OMNICEF   Take 2 capsules (600 mg) by mouth every evening for 4 days   Last time this was given:  600 mg on 4/16/2018  8:20 PM                                   citalopram 20 MG tablet   Commonly known as:  celeXA   Take 1 tablet (20 mg) by mouth daily   Last time this was given:  20 mg on 4/17/2018  8:06 AM                                      [employer] VITAMIN D3 1000 units Caps   Take 1,000 Units by mouth daily                                 furosemide 20 MG tablet   Commonly known as:  LASIX   20 mg 2 times daily Take a total of 2 tablets daily   Last time this was given:  20 mg on 4/17/2018  6:03 AM                                   glipiZIDE 5 MG 24 hr tablet   Commonly known as:  glipiZIDE XL   Take 1 tablet (5 mg) by mouth daily   Last time this was given:  5 mg on 4/17/2018  8:07 AM                                   guaiFENesin-codeine 100-10 MG/5ML Soln solution   Commonly known as:  ROBITUSSIN AC   Take 1-2 tsp. by mouth every 4 hours as needed for cough                                   hydrocortisone 1 % Crea cream   Commonly known as:  PROCTO-LE   APPLY TO RECTAL AREA TWICE DAILY AS NEEDD                                   levothyroxine 100 MCG tablet   Commonly known as:  SYNTHROID/LEVOTHROID   TAKE ONE TABLET BY MOUTH ONCE DAILY   Last time this was given:  100 mcg on 4/17/2018  8:06 AM                                   LORazepam 0.5 MG tablet   Commonly known as:  ATIVAN   Take 1 tablet (0.5 mg) by mouth every 4 hours as needed (Anxiety, Nausea/Vomiting or Sleep)                                   LYRICA 100 MG capsule   Take 1 capsule (100 mg) by mouth 3 times daily Patient reports taking BID sometimes.  [facility]uopathy   Last time this was given:  100 mg on 4/17/2018  8:06 AM   Generic drug:  pregabalin                                   metolazone 5 MG tablet   Commonly known as:  ZAROXOLYN   Take 1 tablet (5 mg) by mouth daily as needed For increased leg swelling                                   MULTIVITAMINS PO   Take  by mouth.                                ondansetron 8 MG tablet   Commonly known as:  ZOFRAN   Take 1 tablet (8 mg) by mouth every 8 hours as needed (Nausea/Vomiting)                                   order for DME   use as needed prn                                   order for DME   Equipment being ordered: Oxygen.  Pt to have 1-2 liters O2 via NC to use with ambulation.  Pt hypoxic to sats  of 85% on RA with ambulation.                                oxyCODONE-acetaminophen 5-325 MG per tablet   Commonly known as:  PERCOCET   Take 1-2 tablets by mouth every 6 hours as needed for pain   Last time this was given:  1 tablet on 4/15/2018  9:43 PM                                potassium chloride SA 20 MEQ CR tablet   Commonly known as:  KLOR-CON   Take 1 tablet (20 mEq) by mouth 3 times daily   Last time this was given:  20 mEq on 4/17/2018  8:07 AM                                predniSONE 20 MG tablet   Commonly known as:  DELTASONE   Take 1 tablet (20 mg) by mouth daily for 3 days   Start taking on:  4/18/2018   Last time this was given:  20 mg on 4/17/2018  8:06 AM                                   prochlorperazine 10 MG tablet   Commonly known as:  COMPAZINE   Take 0.5 tablets (5 mg) by mouth every 6 hours as needed (Nausea/Vomiting)                                   Saw Palmetto (Serenoa repens) 450 MG Caps   Take 450 mg by mouth daily.                                terazosin 5 MG capsule   Commonly known as:  HYTRIN   Take 1 capsule (5 mg) by mouth At Bedtime DO NOT RESTART UNTIL AFTER FOLLOW UP WITH PCP BECAUSE OF LOW NORMAL BLOOD PRESSURE READINGS                                TYLENOL PO   Take 650 mg by mouth every 4 hours as needed for mild pain or fever   Last time this was given:  650 mg on 4/15/2018  9:21 AM                                   vitamin B complex with vitamin C Tabs tablet   Commonly known as:  STRESS TAB   take 1 Tab by mouth daily.                                warfarin 2 MG tablet   Commonly known as:  COUMADIN   No dose on 4/17/18, then take 2 mg on 4/18/18 and recheck INR on 4/19/18, then take as directed by the coumadin clinc.   Last time this was given:  2.5 mg on 4/15/2018 12:22 AM                                   Never

## 2025-04-08 NOTE — ED PROVIDER NOTE - ATTENDING APP SHARED VISIT CONTRIBUTION OF CARE
Pt is an 83yo m, h/o cad, htn, hld, copd, bladder ca, s/p partial  transurethral resection of bladder yesterday with placement of reveles, with no drainage of urine x 30 min, felt as if he was getting backed up. No f/c, flank pain, abd pain, n/v... Afebrile. HDS. Pt is well appearing. Bladder non-distended. No cvat. Abd soft, nt/nd. Reveles flushed with drainage of a small clot, after which urine began to drain. + mild hematuria, now clear/ slightly pink, improved from yesterday as per pt. No indications for labs/ ua/ imaging at this time given short duration of sx's. Suspect obstruction of urinary flow due to blood clot which is now relieved. ED evaluation and management discussed with the patient in detail. Urology notified. Close urology follow up encouraged.  Strict ED return instructions discussed in detail and patient given the opportunity to ask any questions about their discharge diagnosis and instructions. Patient verbalized understanding.

## 2025-04-08 NOTE — ED PROVIDER NOTE - OBJECTIVE STATEMENT
83 y/o m hx CAD, HTN, HLD, COPD, bladder tumor s/p TURBT 4/7 with Dr. Shanks presents c/o reveles not draining for the past hour prior to coming to ED.  Pt stating urine had been blood tinged and then wasn't filling the bag, feels like "something is blocking it."  Pt denies fever, chills, flank pain, n/v, all other ROS negative.

## 2025-04-08 NOTE — ED ADULT NURSE REASSESSMENT NOTE - NS ED NURSE REASSESS COMMENT FT1
Pt Casiano bag changed, connected to leg bag and draining. Pt educated on aseptic technique for cleaning/emptying leg bag.

## 2025-04-08 NOTE — ED ADULT NURSE NOTE - OBJECTIVE STATEMENT
pt from home with c/o urinary retention for 1 hr, s/p surgery yesterday, with Casiano in place, Casiano was flashed small blood clots came out

## 2025-04-08 NOTE — ED ADULT NURSE NOTE - NSICDXPASTSURGICALHX_GEN_ALL_CORE_FT
PAST SURGICAL HISTORY:  COPD (chronic obstructive pulmonary disease)     Depression     S/P bilateral hip replacements     S/P hernia repair

## 2025-04-08 NOTE — ED PROVIDER NOTE - NSFOLLOWUPINSTRUCTIONS_ED_ALL_ED_FT
Casiano Catheter Placement and Care    WHAT YOU NEED TO KNOW:    A Casiano catheter is a sterile tube that is inserted into your bladder to drain urine. It is also called an indwelling urinary catheter.        DISCHARGE INSTRUCTIONS:    Seek care immediately if:    Your catheter comes out.    You suddenly have material that looks like sand in the tubing or drainage bag.    You see blood in the tubing or drainage bag.    Little or no urine is draining into the bag, and you have checked the system.    You have pain in your hip, back, pelvis, or lower abdomen.    You are confused or cannot think clearly.    You see swelling, redness, pus, or burning where the catheter goes into your body.  Call your doctor or urologist if:    You have a fever or shaking chills.    You have bladder spasms for more than 1 day after the catheter is placed.    Your urine has a strong smell.    You have a rash or itching where the catheter tube is secured to your skin.    Urine leaks from or around the catheter, tubing, or drainage bag.    The closed drainage system has accidently come open or apart.    You see a layer of crystals inside the tubing.    You have questions or concerns about your condition or care.  Care for your catheter and drainage bag: You can reduce your risk for infection and injury by caring for your catheter and drainage bag properly.    Wash your hands often. Wash before and after you touch your catheter, tubing, or drainage bag. Use soap and water. Wear clean disposable gloves when you care for your catheter or disconnect the drainage bag. Wash your hands before you prepare or eat food.  Handwashing      Clean your genital area 2 times every day. Clean your catheter area and anal opening after every bowel movement. Use a soapy cloth to clean the area:  If you are male, clean the tip of your penis. Start where the catheter enters. Wipe backward, making sure to pull back the foreskin. Then use a cloth with clear water in the same direction to clean away the soap.    If you are female, clean the area where the catheter enters your body. Separate your labia (the smaller folds of your skin around your vaginal opening) and wipe toward the anus. Then use a cloth with clear water and wipe in the same direction.    Secure the catheter tube so you do not pull or move the catheter. This helps prevent pain and bladder spasms. Healthcare providers will show you how to use medical tape or a strap to secure the catheter tube to your body.    Keep a closed drainage system. Your catheter should always be attached to the drainage bag to form a closed system. Do not disconnect any part of the closed system unless you need to change the bag.    Keep the drainage bag below the level of your waist. This helps stop urine from moving back up the tubing and into your bladder. Do not loop or kink the tubing. This can cause urine to back up and collect in your bladder. Do not let the drainage bag touch or lie on the floor.    Empty the drainage bag when needed. The weight of a full drainage bag can be painful. Empty the drainage bag every 3 to 6 hours or when it is ? full.    Clean and change the drainage bag as directed. Ask your healthcare provider how often you should change the drainage bag and which cleaning solution to use. Wear disposable gloves when you change the bag. Do not allow the end of the catheter or tubing to touch anything. Clean the ends with an alcohol pad before you reconnect them.    Drink liquids as directed. This will help keep your urine clear and prevent catheter blockage and infection. Good choices for most people include water, juice, and milk. Ask how much liquid to drink each day and which liquids are best for you.  What to do if problems develop:    No urine is draining into the bag:  Check for kinks in the tubing and straighten them out.    Check the tape or strap used to secure the catheter tube to your skin. Make sure it is not blocking the tube.    Make sure you are not sitting or lying on the tubing.    Make sure the urine bag is hanging below the level of your waist.    Urine leaks from or around the catheter, tubing, or drainage bag: Check if the closed drainage system has accidently come open or apart. Clean the catheter and tubing ends with a new alcohol pad and reconnect them.  Follow up with your doctor or urologist as directed: Write down your questions so you remember to ask them during your visits.

## 2025-04-09 ENCOUNTER — NON-APPOINTMENT (OUTPATIENT)
Age: 85
End: 2025-04-09

## 2025-04-09 DIAGNOSIS — N32.89 OTHER SPECIFIED DISORDERS OF BLADDER: ICD-10-CM

## 2025-04-09 RX ORDER — MIRABEGRON 25 MG/1
25 TABLET, EXTENDED RELEASE ORAL
Qty: 10 | Refills: 0 | Status: ACTIVE | COMMUNITY
Start: 2025-04-09 | End: 1900-01-01

## 2025-04-10 DIAGNOSIS — T83.091A OTHER MECHANICAL COMPLICATION OF INDWELLING URETHRAL CATHETER, INITIAL ENCOUNTER: ICD-10-CM

## 2025-04-10 DIAGNOSIS — Z85.51 PERSONAL HISTORY OF MALIGNANT NEOPLASM OF BLADDER: ICD-10-CM

## 2025-04-10 DIAGNOSIS — E78.5 HYPERLIPIDEMIA, UNSPECIFIED: ICD-10-CM

## 2025-04-10 DIAGNOSIS — I10 ESSENTIAL (PRIMARY) HYPERTENSION: ICD-10-CM

## 2025-04-10 DIAGNOSIS — I25.10 ATHEROSCLEROTIC HEART DISEASE OF NATIVE CORONARY ARTERY WITHOUT ANGINA PECTORIS: ICD-10-CM

## 2025-04-10 DIAGNOSIS — J44.9 CHRONIC OBSTRUCTIVE PULMONARY DISEASE, UNSPECIFIED: ICD-10-CM

## 2025-04-11 ENCOUNTER — NON-APPOINTMENT (OUTPATIENT)
Age: 85
End: 2025-04-11

## 2025-04-11 DIAGNOSIS — C67.9 MALIGNANT NEOPLASM OF BLADDER, UNSPECIFIED: ICD-10-CM

## 2025-04-12 ENCOUNTER — TRANSCRIPTION ENCOUNTER (OUTPATIENT)
Age: 85
End: 2025-04-12

## 2025-04-15 ENCOUNTER — APPOINTMENT (OUTPATIENT)
Dept: UROLOGY | Facility: CLINIC | Age: 85
End: 2025-04-15
Payer: MEDICARE

## 2025-04-15 PROCEDURE — 99213 OFFICE O/P EST LOW 20 MIN: CPT

## 2025-04-22 DIAGNOSIS — E78.5 HYPERLIPIDEMIA, UNSPECIFIED: ICD-10-CM

## 2025-04-22 DIAGNOSIS — C67.9 MALIGNANT NEOPLASM OF BLADDER, UNSPECIFIED: ICD-10-CM

## 2025-04-22 DIAGNOSIS — I10 ESSENTIAL (PRIMARY) HYPERTENSION: ICD-10-CM

## 2025-04-22 DIAGNOSIS — I25.10 ATHEROSCLEROTIC HEART DISEASE OF NATIVE CORONARY ARTERY WITHOUT ANGINA PECTORIS: ICD-10-CM

## 2025-04-22 DIAGNOSIS — J44.9 CHRONIC OBSTRUCTIVE PULMONARY DISEASE, UNSPECIFIED: ICD-10-CM

## 2025-05-02 VITALS
HEIGHT: 68 IN | DIASTOLIC BLOOD PRESSURE: 67 MMHG | TEMPERATURE: 97 F | OXYGEN SATURATION: 96 % | WEIGHT: 201.72 LBS | RESPIRATION RATE: 18 BRPM | SYSTOLIC BLOOD PRESSURE: 148 MMHG | HEART RATE: 68 BPM

## 2025-05-02 RX ORDER — ASPIRIN 325 MG
0 TABLET ORAL
Refills: 0 | DISCHARGE

## 2025-05-02 NOTE — ASU PATIENT PROFILE, ADULT - NSICDXPASTMEDICALHX_GEN_ALL_CORE_FT
PAST MEDICAL HISTORY:  CAD (coronary artery disease)     History of abdominal aortic aneurysm (AAA)     HLD (hyperlipidemia)     [place] (hard of hearing)     HTN (hypertension)     Occipital neuralgia

## 2025-05-02 NOTE — ASU PATIENT PROFILE, ADULT - FALL HARM RISK - UNIVERSAL INTERVENTIONS
Bed in lowest position, wheels locked, appropriate side rails in place/Call bell, personal items and telephone in reach/Instruct patient to call for assistance before getting out of bed or chair/Non-slip footwear when patient is out of bed/[place] to call system/Physically safe environment - no spills, clutter or unnecessary equipment/Purposeful Proactive Rounding/Room/bathroom lighting operational, light cord in reach

## 2025-05-02 NOTE — ASU PATIENT PROFILE, ADULT - PRESSURE ULCER(S)
no No. HUMPHREY screening performed.  STOP BANG Legend: 0-2 = LOW Risk; 3-4 = INTERMEDIATE Risk; 5-8 = HIGH Risk

## 2025-05-02 NOTE — ASU PATIENT PROFILE, ADULT - NSICDXPASTSURGICALHX_GEN_ALL_CORE_FT
PAST SURGICAL HISTORY:  COPD (chronic obstructive pulmonary disease)     Depression     S/P bilateral hip replacements     S/P hernia repair x 3

## 2025-05-02 NOTE — PRE-OP CHECKLIST - 2.
Does not want wife Sathish Pierce nor other sister Briseyda to know why he is here, only call Destiney Garnett

## 2025-05-05 ENCOUNTER — APPOINTMENT (OUTPATIENT)
Dept: UROLOGY | Facility: HOSPITAL | Age: 85
End: 2025-05-05

## 2025-05-05 ENCOUNTER — RESULT REVIEW (OUTPATIENT)
Age: 85
End: 2025-05-05

## 2025-05-05 ENCOUNTER — INPATIENT (INPATIENT)
Facility: HOSPITAL | Age: 85
LOS: 0 days | Discharge: HOME | DRG: 670 | End: 2025-05-06
Attending: UROLOGY | Admitting: UROLOGY
Payer: MEDICARE

## 2025-05-05 ENCOUNTER — TRANSCRIPTION ENCOUNTER (OUTPATIENT)
Age: 85
End: 2025-05-05

## 2025-05-05 DIAGNOSIS — Z98.890 OTHER SPECIFIED POSTPROCEDURAL STATES: Chronic | ICD-10-CM

## 2025-05-05 DIAGNOSIS — Z96.643 PRESENCE OF ARTIFICIAL HIP JOINT, BILATERAL: Chronic | ICD-10-CM

## 2025-05-05 DIAGNOSIS — J44.9 CHRONIC OBSTRUCTIVE PULMONARY DISEASE, UNSPECIFIED: Chronic | ICD-10-CM

## 2025-05-05 DIAGNOSIS — F32.A DEPRESSION, UNSPECIFIED: Chronic | ICD-10-CM

## 2025-05-05 PROCEDURE — 88305 TISSUE EXAM BY PATHOLOGIST: CPT | Mod: 26

## 2025-05-05 PROCEDURE — 52235 CYSTOSCOPY AND TREATMENT: CPT

## 2025-05-05 RX ORDER — LOSARTAN POTASSIUM 100 MG/1
25 TABLET, FILM COATED ORAL DAILY
Refills: 0 | Status: DISCONTINUED | OUTPATIENT
Start: 2025-05-06 | End: 2025-05-06

## 2025-05-05 RX ORDER — ATORVASTATIN CALCIUM 80 MG/1
40 TABLET, FILM COATED ORAL AT BEDTIME
Refills: 0 | Status: DISCONTINUED | OUTPATIENT
Start: 2025-05-05 | End: 2025-05-06

## 2025-05-05 RX ORDER — OXYBUTYNIN CHLORIDE 5 MG/1
1 TABLET, FILM COATED, EXTENDED RELEASE ORAL
Qty: 2 | Refills: 0
Start: 2025-05-05 | End: 2025-05-06

## 2025-05-05 RX ORDER — OXYBUTYNIN CHLORIDE 5 MG/1
5 TABLET, FILM COATED, EXTENDED RELEASE ORAL EVERY 8 HOURS
Refills: 0 | Status: COMPLETED | OUTPATIENT
Start: 2025-05-05 | End: 2026-04-03

## 2025-05-05 RX ORDER — ACETAMINOPHEN 500 MG/5ML
650 LIQUID (ML) ORAL EVERY 6 HOURS
Refills: 0 | Status: DISCONTINUED | OUTPATIENT
Start: 2025-05-05 | End: 2025-05-06

## 2025-05-05 RX ORDER — DIAZEPAM 2 MG/1
5 TABLET ORAL DAILY
Refills: 0 | Status: DISCONTINUED | OUTPATIENT
Start: 2025-05-06 | End: 2025-05-06

## 2025-05-05 RX ORDER — PHENAZOPYRIDINE HCL 100 MG
100 TABLET ORAL ONCE
Refills: 0 | Status: DISCONTINUED | OUTPATIENT
Start: 2025-05-05 | End: 2025-05-06

## 2025-05-05 RX ORDER — ALBUTEROL SULFATE 2.5 MG/3ML
2 VIAL, NEBULIZER (ML) INHALATION EVERY 6 HOURS
Refills: 0 | Status: DISCONTINUED | OUTPATIENT
Start: 2025-05-05 | End: 2025-05-06

## 2025-05-05 RX ORDER — BUPRENORPHINE 7.5 UG/H
0 PATCH, EXTENDED RELEASE TRANSDERMAL
Refills: 0 | DISCHARGE

## 2025-05-05 RX ORDER — ALBUTEROL SULFATE 2.5 MG/3ML
2 VIAL, NEBULIZER (ML) INHALATION
Refills: 0 | DISCHARGE

## 2025-05-05 RX ORDER — BUPROPION HYDROBROMIDE 522 MG/1
5 TABLET, EXTENDED RELEASE ORAL
Refills: 0 | DISCHARGE

## 2025-05-05 RX ORDER — METOPROLOL SUCCINATE 50 MG/1
25 TABLET, EXTENDED RELEASE ORAL DAILY
Refills: 0 | Status: DISCONTINUED | OUTPATIENT
Start: 2025-05-06 | End: 2025-05-06

## 2025-05-05 RX ORDER — OXYBUTYNIN CHLORIDE 5 MG/1
5 TABLET, FILM COATED, EXTENDED RELEASE ORAL EVERY 8 HOURS
Refills: 0 | Status: DISCONTINUED | OUTPATIENT
Start: 2025-05-05 | End: 2025-05-06

## 2025-05-05 RX ADMIN — Medication 650 MILLIGRAM(S): at 22:56

## 2025-05-05 RX ADMIN — ATORVASTATIN CALCIUM 40 MILLIGRAM(S): 80 TABLET, FILM COATED ORAL at 21:56

## 2025-05-05 RX ADMIN — OXYBUTYNIN CHLORIDE 5 MILLIGRAM(S): 5 TABLET, FILM COATED, EXTENDED RELEASE ORAL at 17:52

## 2025-05-05 RX ADMIN — DIAZEPAM 5 MILLIGRAM(S): 2 TABLET ORAL at 22:26

## 2025-05-05 RX ADMIN — Medication 650 MILLIGRAM(S): at 21:56

## 2025-05-05 NOTE — ASU DISCHARGE PLAN (ADULT/PEDIATRIC) - ASU DC SPECIAL INSTRUCTIONSFT
TRANSURETHRAL RESECTION OF BLADDER TUMOR    GENERAL: It is common to have blood in your urine after your procedure.  It may be pink or even red; and it is important to increase fluid intake to 2-3L of water per day to keep the urine as clear as possible. Please inform your doctor if you have a significant amount of clot in the urine or if you are unable to void at all.  The urine may clear and then become bloody again especially as you are more physically active. It is not uncommon to have some burning when you urinate, this will gradually improve. If a catheter is in place, it is not uncommon to have occasional leakage or urine or blood around the catheter. Please call your urologist if this is excessive and/or the urine is not draining through the catheter into the bag.    CATHETER: Some patients are sent home with a Reveles catheter, while others go home urinating on their own. A Reveles catheter continuously drains the urine from the bladder. If you still have a catheter, the nurses will review instructions and care before you go home.    UROLOGIC MEDICATIONS:  The following medications may have been sent to your pharmacy for catheter related discomfort: ditropan to prevent bladder spasms.    PAIN: You may take Tylenol (acetaminophen) 650-975mg and/or Motrin (ibuprofen) 400-600mg, both available over the counter, for pain every 6 hours as needed. Do not exceed 4000mg of Tylenol (acetaminophen) daily. You may alternate these medications such that you take one or the other every 3 hours for around the clock pain coverage.    ANTIBIOTICS: Please continue the antibiotic you are taking    STOOL SOFTENERS: Do not allow yourself to become constipated as straining may cause bleeding. Take stool softeners or a laxative (ex. Miralax, Colace, Senokot, ExLax, etc), available over the counter, if needed.    ANTICOAGULATION: If you are taking any blood thinning medications, please discuss with your urologist prior to restarting these medications unless otherwise specified.    BATHING: You may shower or bathe. If going home with reveles, shower only until catheter is removed.    DIET: You may resume your regular diet and regular medication regimen.    ACTIVITY: No heavy lifting or strenuous exercise until you are evaluated at your post-operative appointment. Otherwise, you may return to your usual level of physical activity.    FOLLOW-UP: If you did not already schedule your post-operative appointment, please call your urologist to schedule and follow-up appointment.    CALL YOUR UROLOGIST IF: You have any bleeding that does not stop, inability to void >8 hours, fever over 100.4 F, chills, persistent nausea/vomiting, changes in your incision concerning for infection, or if your pain is not controlled on your discharge pain medications.

## 2025-05-05 NOTE — PRE-ANESTHESIA EVALUATION ADULT - NSANTHOSAYNRD_GEN_A_CORE
No. CELESTE screening performed.  STOP BANG Legend: 0-2 = LOW Risk; 3-4 = INTERMEDIATE Risk; 5-8 = HIGH Risk
No. CELESTE screening performed.  STOP BANG Legend: 0-2 = LOW Risk; 3-4 = INTERMEDIATE Risk; 5-8 = HIGH Risk

## 2025-05-05 NOTE — ASU DISCHARGE PLAN (ADULT/PEDIATRIC) - FINANCIAL ASSISTANCE
[facility] provides services at a reduced cost to those who are determined to be eligible through [facility]’s financial assistance program. Information regarding [facility]’s financial assistance program can be found by going to [email or web address] or by calling [phone].

## 2025-05-06 ENCOUNTER — TRANSCRIPTION ENCOUNTER (OUTPATIENT)
Age: 85
End: 2025-05-06

## 2025-05-06 VITALS
TEMPERATURE: 97 F | OXYGEN SATURATION: 94 % | RESPIRATION RATE: 17 BRPM | DIASTOLIC BLOOD PRESSURE: 68 MMHG | HEART RATE: 67 BPM | SYSTOLIC BLOOD PRESSURE: 109 MMHG

## 2025-05-06 LAB
ANION GAP SERPL CALC-SCNC: 7 MMOL/L — SIGNIFICANT CHANGE UP (ref 5–17)
BUN SERPL-MCNC: 16 MG/DL — SIGNIFICANT CHANGE UP (ref 7–23)
CALCIUM SERPL-MCNC: 8.4 MG/DL — SIGNIFICANT CHANGE UP (ref 8.4–10.5)
CHLORIDE SERPL-SCNC: 107 MMOL/L — SIGNIFICANT CHANGE UP (ref 96–108)
CO2 SERPL-SCNC: 23 MMOL/L — SIGNIFICANT CHANGE UP (ref 22–31)
CREAT SERPL-MCNC: 1.38 MG/DL — HIGH (ref 0.5–1.3)
EGFR: 50 ML/MIN/1.73M2 — LOW
EGFR: 50 ML/MIN/1.73M2 — LOW
GLUCOSE SERPL-MCNC: 106 MG/DL — HIGH (ref 70–99)
HCT VFR BLD CALC: 37.2 % — LOW (ref 39–50)
HGB BLD-MCNC: 12 G/DL — LOW (ref 13–17)
MCHC RBC-ENTMCNC: 30.9 PG — SIGNIFICANT CHANGE UP (ref 27–34)
MCHC RBC-ENTMCNC: 32.3 G/DL — SIGNIFICANT CHANGE UP (ref 32–36)
MCV RBC AUTO: 95.9 FL — SIGNIFICANT CHANGE UP (ref 80–100)
NRBC BLD AUTO-RTO: 0 /100 WBCS — SIGNIFICANT CHANGE UP (ref 0–0)
PLATELET # BLD AUTO: 142 K/UL — LOW (ref 150–400)
POTASSIUM SERPL-MCNC: 4.2 MMOL/L — SIGNIFICANT CHANGE UP (ref 3.5–5.3)
POTASSIUM SERPL-SCNC: 4.2 MMOL/L — SIGNIFICANT CHANGE UP (ref 3.5–5.3)
RBC # BLD: 3.88 M/UL — LOW (ref 4.2–5.8)
RBC # FLD: 13.1 % — SIGNIFICANT CHANGE UP (ref 10.3–14.5)
SODIUM SERPL-SCNC: 137 MMOL/L — SIGNIFICANT CHANGE UP (ref 135–145)
WBC # BLD: 8.02 K/UL — SIGNIFICANT CHANGE UP (ref 3.8–10.5)
WBC # FLD AUTO: 8.02 K/UL — SIGNIFICANT CHANGE UP (ref 3.8–10.5)

## 2025-05-06 PROCEDURE — 86901 BLOOD TYPING SEROLOGIC RH(D): CPT

## 2025-05-06 PROCEDURE — 86900 BLOOD TYPING SEROLOGIC ABO: CPT

## 2025-05-06 PROCEDURE — 80048 BASIC METABOLIC PNL TOTAL CA: CPT

## 2025-05-06 PROCEDURE — 88305 TISSUE EXAM BY PATHOLOGIST: CPT

## 2025-05-06 PROCEDURE — 36415 COLL VENOUS BLD VENIPUNCTURE: CPT

## 2025-05-06 PROCEDURE — 86850 RBC ANTIBODY SCREEN: CPT

## 2025-05-06 PROCEDURE — 85027 COMPLETE CBC AUTOMATED: CPT

## 2025-05-06 PROCEDURE — 52224 CYSTOSCOPY AND TREATMENT: CPT

## 2025-05-06 RX ADMIN — Medication 650 MILLIGRAM(S): at 05:12

## 2025-05-06 RX ADMIN — LOSARTAN POTASSIUM 25 MILLIGRAM(S): 100 TABLET, FILM COATED ORAL at 05:12

## 2025-05-06 RX ADMIN — Medication 650 MILLIGRAM(S): at 06:12

## 2025-05-06 RX ADMIN — METOPROLOL SUCCINATE 25 MILLIGRAM(S): 50 TABLET, EXTENDED RELEASE ORAL at 05:12

## 2025-05-06 NOTE — DISCHARGE NOTE PROVIDER - CARE PROVIDER_API CALL
Sathish Shanks  Urology  [address] [address]  Phone: [phone]  Fax: [phone]  Follow Up Time:

## 2025-05-06 NOTE — DISCHARGE NOTE PROVIDER - NSDCCPCAREPLAN_GEN_ALL_CORE_FT
PRINCIPAL DISCHARGE DIAGNOSIS  Diagnosis: Bladder mass  Assessment and Plan of Treatment: Follow up Thursday 05/08 for trial of void with Dr Shanks. Stop all bladder spasm medications 24hrs prior to appointment.      SECONDARY DISCHARGE DIAGNOSES  Diagnosis: COPD, mild  Assessment and Plan of Treatment: Resume home medication regimen. Follow up with PCP/Specialist.    Diagnosis: CAD (coronary artery disease)  Assessment and Plan of Treatment: Resume home medication regimen. Follow up with PCP/Specialist.    Diagnosis: Hypertension  Assessment and Plan of Treatment: Resume home medication regimen. Follow up with PCP/Specialist.

## 2025-05-06 NOTE — PROGRESS NOTE ADULT - ASSESSMENT
Patient is a 84m w/ Bladder Tumor s/p TURBT.  Patient tolerated procedure well, VSS, HDS, and afebrile.    Plan:  -Diet: Regular  -Pain control  -Monitor Urine output (Casiano catheter)  -OOB/IS  -IVF  -DVT ppx: SCD

## 2025-05-06 NOTE — DISCHARGE NOTE PROVIDER - HOSPITAL COURSE
84 year old male with history of bladder mass s/p TURBT 05/05. Surgical course without complications. Hospitalization course unremarkable. Patient is afebrile, hemodynamically stable and optimized for discharge home.

## 2025-05-06 NOTE — PROGRESS NOTE ADULT - ASSESSMENT
ASSESSMENT: 84yMale w/ bladder tumor s/p TURBT.  Patient tolerated procedure well VSS, HDS, and afebrile.     PLAN:  Diet: Reg  Pain control  Monitor Urine Output  DVT ppx: SCD  OOB/IS

## 2025-05-06 NOTE — DISCHARGE NOTE PROVIDER - NSCORESITESY/N_GEN_A_CORE_RD
Chart Review:  10/1/2022    [facility] did review pt's chart and is aware pt is currently at the ED  CMOC to f/u to discuss status of PA 1719 needed to be completed for [place] application  Next outreach is scheduled for 11/2/2022 
No

## 2025-05-06 NOTE — DISCHARGE NOTE PROVIDER - NSDCFUADDINST_GEN_ALL_CORE_FT
Casiano Catheter Instructions:  - Please care for and empty urinary catheter bag as instructed by nurse.    General Discharge Instructions:  Use Tylenol for pain control as needed  Please resume all regular home medications unless specifically advised not to take a particular medication. Also, please take any new medications as prescribed.  Please get plenty of rest, continue to ambulate several times per day, and drink adequate amounts of fluids.     Warning Signs:  Please call your doctor if you experience the following:  *You experience new chest pain, pressure, squeezing or tightness.  *New or worsening cough, shortness of breath, or wheeze.  *If you are vomiting and cannot keep down fluids or your medications.  *You are getting dehydrated due to continued vomiting, diarrhea, or other reasons. Signs of dehydration include dry mouth, rapid heartbeat, or feeling dizzy or faint when standing.  *You see blood or dark/black material when you vomit or have a bowel movement.  *You experience burning when you urinate, have blood in your urine, or experience a discharge.  *Your pain is not improving within 8-12 hours or is not gone within 24 hours. Call or return immediately if your pain is getting worse, changes location, or moves to your chest or back.  *You have shaking chills, or fever greater than 100.4 degrees Fahrenheit.  *Any change in your symptoms, or any new symptoms that concern you.

## 2025-05-06 NOTE — PROGRESS NOTE ADULT - SUBJECTIVE AND OBJECTIVE BOX
UROLOGY POST OP NOTE (PAGER # [phone])    PROCEDURE: TURBT    Patient seen at bedside alert and oriented x 3.  Patient denies n/v, chest pain, sob.      T(C): 36.7 (05-05-25 @ 23:51), Max: 36.9 (05-05-25 @ 17:12)  HR: 67 (05-05-25 @ 23:51) (67 - 76)  BP: 131/68 (05-05-25 @ 23:51) (121/70 - 149/64)  RR: 17 (05-05-25 @ 23:51) (13 - 20)  SpO2: 95% (05-05-25 @ 23:51) (93% - 98%)  Wt(kg): --    ON PE:    Abdomen: soft nt/nd.    : Casiano catheter in place draining pink/clear.               
INTERVAL HPI/OVERNIGHT EVENTS:  No acute events overnight. Patient specifically wanted to be manually irrigated.   team instilled approx 100cc of sterile water into catheter and removed no clots.  Patient urine pink/clear.    VITALS:    T(F): 97.9 (05-06-25 @ 05:07), Max: 98.4 (05-05-25 @ 17:12)  HR: 79 (05-06-25 @ 05:07) (67 - 79)  BP: 143/89 (05-06-25 @ 05:07) (121/70 - 149/64)  RR: 16 (05-06-25 @ 05:07) (13 - 20)  SpO2: 93% (05-06-25 @ 05:07) (93% - 98%)  Wt(kg): --    I&O's Detail    05 May 2025 07:01  -  06 May 2025 06:54  --------------------------------------------------------  IN:    Oral Fluid: 300 mL    sodium chloride 0.9%: 800 mL  Total IN: 1100 mL    OUT:    Indwelling Catheter - Urethral (mL): 500 mL  Total OUT: 500 mL    Total NET: 600 mL          MEDICATIONS:    ANTIBIOTICS:      PAIN CONTROL:  acetaminophen     Tablet .. 650 milliGRAM(s) Oral every 6 hours PRN  diazepam    Tablet 5 milliGRAM(s) Oral daily       MEDS:  oxybutynin 5 milliGRAM(s) Oral every 8 hours PRN  phenazopyridine 100 milliGRAM(s) Oral once PRN      HEME/ONC        PHYSICAL EXAM:  General: No acute distress.  Alert and Oriented x 3  Abdominal Exam: soft nt/nd   Exam: Casiano catheter in place draining clear.      LABS:                        12.0   8.02  )-----------( 142      ( 06 May 2025 05:56 )             37.2     05-06    137  |  107  |  16  ----------------------------<  106[H]  4.2   |  23  |  1.38[H]    Ca    8.4      06 May 2025 05:56        Urinalysis Basic - ( 06 May 2025 05:56 )    Color: x / Appearance: x / SG: x / pH: x  Gluc: 106 mg/dL / Ketone: x  / Bili: x / Urobili: x   Blood: x / Protein: x / Nitrite: x   Leuk Esterase: x / RBC: x / WBC x   Sq Epi: x / Non Sq Epi: x / Bacteria: x        RADIOLOGY & ADDITIONAL TESTS:

## 2025-05-06 NOTE — DISCHARGE NOTE NURSING/CASE MANAGEMENT/SOCIAL WORK - PATIENT PORTAL LINK FT
You can access the FollowMyHealth Patient Portal offered by [facility] by registering at the following website: [email or web address]. By joining [employer]’s FollowMyHealth portal, you will also be able to view your health information using other applications (apps) compatible with our system.

## 2025-05-06 NOTE — DISCHARGE NOTE NURSING/CASE MANAGEMENT/SOCIAL WORK - NSDCPEFALRISK_GEN_ALL_CORE
For information on Fall & Injury Prevention, visit: [email or web address] OR  [email or web address] OR  https://www.cdc.gov/steadi/patient.html

## 2025-05-06 NOTE — DISCHARGE NOTE NURSING/CASE MANAGEMENT/SOCIAL WORK - FINANCIAL ASSISTANCE
[facility] provides services at a reduced cost to those who are determined to be eligible through [facility]’s financial assistance program. Information regarding [facility]’s financial assistance program can be found by going to [email or web address] or by calling [phone].

## 2025-05-06 NOTE — DISCHARGE NOTE PROVIDER - NSDCMRMEDTOKEN_GEN_ALL_CORE_FT
Albuterol (Eqv-ProAir HFA) 90 mcg/inh inhalation aerosol: 2 inhaled prn  Anoro Ellipta 62.5 mcg-25 mcg/inh inhalation powder: 1 puff(s) inhaled once a day  atorvastatin 40 mg oral tablet: 1 tab(s) orally once a day  Buprenorphine:   buPROPion: 5 milligram(s) orally once a day  Colace 100 mg oral capsule: 1 cap(s) orally 3 times a day (with meals) as needed for  constipation  diazePAM 5 mg oral tablet: 1 tab(s) orally once a day  losartan 25 mg oral tablet: 1 tab(s) orally once a day  metoprolol succinate 25 mg oral tablet, extended release: 1 tab(s) orally once a day  mirabegron 25 mg oral tablet, extended release: 1 tab(s) orally once a day as needed for  bladder spasms

## 2025-05-07 NOTE — HISTORY OF PRESENT ILLNESS
CERTIFICATE OF WORK       May 7, 2025      Re: Emilee Robles  [address] [place] [address]      This is to certify that Emilee Robles has been under my care from 5/7/2025 and can return to regular work.    RESTRICTIONS: NONE      REMARKS: NONE                Maciel Burns MD  [facility]  [address]  [place] [address]  Phone: [phone]  Fax: [phone]  
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 Reno for pulmonary

## 2025-05-08 ENCOUNTER — APPOINTMENT (OUTPATIENT)
Dept: UROLOGY | Facility: CLINIC | Age: 85
End: 2025-05-08
Payer: MEDICARE

## 2025-05-08 VITALS
DIASTOLIC BLOOD PRESSURE: 55 MMHG | TEMPERATURE: 97.7 F | HEIGHT: 68 IN | HEART RATE: 61 BPM | SYSTOLIC BLOOD PRESSURE: 125 MMHG | WEIGHT: 210 LBS | OXYGEN SATURATION: 97 % | BODY MASS INDEX: 31.83 KG/M2

## 2025-05-08 PROCEDURE — 99213 OFFICE O/P EST LOW 20 MIN: CPT

## 2025-05-20 DIAGNOSIS — I25.10 ATHEROSCLEROTIC HEART DISEASE OF NATIVE CORONARY ARTERY WITHOUT ANGINA PECTORIS: ICD-10-CM

## 2025-05-20 DIAGNOSIS — C67.1 MALIGNANT NEOPLASM OF DOME OF BLADDER: ICD-10-CM

## 2025-05-20 DIAGNOSIS — H91.90 UNSPECIFIED HEARING LOSS, UNSPECIFIED EAR: ICD-10-CM

## 2025-05-20 DIAGNOSIS — F32.A DEPRESSION, UNSPECIFIED: ICD-10-CM

## 2025-05-20 DIAGNOSIS — J44.9 CHRONIC OBSTRUCTIVE PULMONARY DISEASE, UNSPECIFIED: ICD-10-CM

## 2025-05-20 DIAGNOSIS — E78.5 HYPERLIPIDEMIA, UNSPECIFIED: ICD-10-CM

## 2025-05-20 DIAGNOSIS — I10 ESSENTIAL (PRIMARY) HYPERTENSION: ICD-10-CM

## 2025-05-20 DIAGNOSIS — Z96.643 PRESENCE OF ARTIFICIAL HIP JOINT, BILATERAL: ICD-10-CM

## 2025-06-04 ENCOUNTER — APPOINTMENT (OUTPATIENT)
Dept: UROLOGY | Facility: CLINIC | Age: 85
End: 2025-06-04
Payer: MEDICARE

## 2025-06-04 VITALS
HEART RATE: 69 BPM | OXYGEN SATURATION: 96 % | TEMPERATURE: 98 F | DIASTOLIC BLOOD PRESSURE: 68 MMHG | SYSTOLIC BLOOD PRESSURE: 133 MMHG

## 2025-06-04 DIAGNOSIS — C67.9 MALIGNANT NEOPLASM OF BLADDER, UNSPECIFIED: ICD-10-CM

## 2025-06-04 PROCEDURE — 51720 TREATMENT OF BLADDER LESION: CPT

## 2025-06-04 RX ORDER — BACILLUS CALMETTE-GUERIN 50 MG/50ML
50 POWDER, FOR SUSPENSION INTRAVESICAL
Qty: 0 | Refills: 0 | Status: COMPLETED | OUTPATIENT
Start: 2025-06-04

## 2025-06-04 RX ADMIN — BACILLUS CALMETTE-GUERIN 0 MG: 50 POWDER, FOR SUSPENSION INTRAVESICAL at 00:00

## 2025-06-11 ENCOUNTER — APPOINTMENT (OUTPATIENT)
Dept: UROLOGY | Facility: CLINIC | Age: 85
End: 2025-06-11
Payer: MEDICARE

## 2025-06-11 VITALS
OXYGEN SATURATION: 94 % | WEIGHT: 210 LBS | DIASTOLIC BLOOD PRESSURE: 77 MMHG | BODY MASS INDEX: 31.83 KG/M2 | HEIGHT: 68 IN | SYSTOLIC BLOOD PRESSURE: 132 MMHG | HEART RATE: 64 BPM | TEMPERATURE: 98.6 F

## 2025-06-11 PROCEDURE — 51720 TREATMENT OF BLADDER LESION: CPT

## 2025-06-11 RX ORDER — BACILLUS CALMETTE-GUERIN 50 MG/50ML
50 POWDER, FOR SUSPENSION INTRAVESICAL
Qty: 0 | Refills: 0 | Status: COMPLETED | OUTPATIENT
Start: 2025-06-11

## 2025-06-11 RX ADMIN — BACILLUS CALMETTE-GUERIN 0 MG: 50 POWDER, FOR SUSPENSION INTRAVESICAL at 00:00

## 2025-06-18 ENCOUNTER — APPOINTMENT (OUTPATIENT)
Dept: UROLOGY | Facility: CLINIC | Age: 85
End: 2025-06-18
Payer: MEDICARE

## 2025-06-18 VITALS
HEIGHT: 71 IN | WEIGHT: 210 LBS | DIASTOLIC BLOOD PRESSURE: 76 MMHG | TEMPERATURE: 97.6 F | HEART RATE: 66 BPM | OXYGEN SATURATION: 95 % | SYSTOLIC BLOOD PRESSURE: 132 MMHG | BODY MASS INDEX: 29.4 KG/M2

## 2025-06-18 PROCEDURE — 51720 TREATMENT OF BLADDER LESION: CPT

## 2025-06-18 RX ADMIN — BACILLUS CALMETTE-GUERIN 0 MG: 50 POWDER, FOR SUSPENSION INTRAVESICAL at 00:00

## 2025-06-25 ENCOUNTER — APPOINTMENT (OUTPATIENT)
Dept: UROLOGY | Facility: CLINIC | Age: 85
End: 2025-06-25
Payer: MEDICARE

## 2025-06-25 VITALS
HEIGHT: 71 IN | SYSTOLIC BLOOD PRESSURE: 127 MMHG | WEIGHT: 210 LBS | HEART RATE: 66 BPM | DIASTOLIC BLOOD PRESSURE: 79 MMHG | BODY MASS INDEX: 29.4 KG/M2 | TEMPERATURE: 97.5 F | OXYGEN SATURATION: 95 %

## 2025-06-25 PROCEDURE — 51720 TREATMENT OF BLADDER LESION: CPT

## 2025-06-25 RX ADMIN — BACILLUS CALMETTE-GUERIN 0 MG: 50 POWDER, FOR SUSPENSION INTRAVESICAL at 00:00

## 2025-07-02 ENCOUNTER — APPOINTMENT (OUTPATIENT)
Dept: UROLOGY | Facility: CLINIC | Age: 85
End: 2025-07-02
Payer: MEDICARE

## 2025-07-02 VITALS
OXYGEN SATURATION: 95 % | DIASTOLIC BLOOD PRESSURE: 72 MMHG | BODY MASS INDEX: 30.31 KG/M2 | WEIGHT: 200 LBS | HEART RATE: 70 BPM | HEIGHT: 68 IN | TEMPERATURE: 97.8 F | SYSTOLIC BLOOD PRESSURE: 122 MMHG

## 2025-07-02 PROCEDURE — 51720 TREATMENT OF BLADDER LESION: CPT

## 2025-07-02 RX ORDER — BACILLUS CALMETTE-GUERIN 50 MG/50ML
50 POWDER, FOR SUSPENSION INTRAVESICAL
Qty: 0 | Refills: 0 | Status: COMPLETED | OUTPATIENT
Start: 2025-07-02

## 2025-07-02 RX ADMIN — BACILLUS CALMETTE-GUERIN 0 MG: 50 POWDER, FOR SUSPENSION INTRAVESICAL at 00:00

## 2025-07-09 ENCOUNTER — APPOINTMENT (OUTPATIENT)
Dept: UROLOGY | Facility: CLINIC | Age: 85
End: 2025-07-09
Payer: MEDICARE

## 2025-07-09 PROCEDURE — 51720 TREATMENT OF BLADDER LESION: CPT

## 2025-07-09 RX ORDER — BACILLUS CALMETTE-GUERIN 50 MG/50ML
50 POWDER, FOR SUSPENSION INTRAVESICAL
Qty: 0 | Refills: 0 | Status: COMPLETED | OUTPATIENT
Start: 2025-07-09

## 2025-07-09 RX ADMIN — BACILLUS CALMETTE-GUERIN 0 MG: 50 POWDER, FOR SUSPENSION INTRAVESICAL at 00:00

## 2025-07-24 RX ORDER — DIAZEPAM 5 MG/1
5 TABLET ORAL DAILY
Qty: 30 | Refills: 0 | Status: ACTIVE | COMMUNITY
Start: 2025-07-24 | End: 1900-01-01

## 2025-08-19 ENCOUNTER — APPOINTMENT (OUTPATIENT)
Dept: UROLOGY | Facility: CLINIC | Age: 85
End: 2025-08-19
Payer: MEDICARE

## 2025-08-19 VITALS
HEART RATE: 69 BPM | TEMPERATURE: 98.2 F | WEIGHT: 210 LBS | HEIGHT: 68 IN | BODY MASS INDEX: 31.83 KG/M2 | OXYGEN SATURATION: 96 % | SYSTOLIC BLOOD PRESSURE: 145 MMHG | DIASTOLIC BLOOD PRESSURE: 80 MMHG

## 2025-08-19 PROCEDURE — 52000 CYSTOURETHROSCOPY: CPT

## 2025-08-22 LAB — URINE CYTOLOGY: NORMAL

## 2025-08-26 ENCOUNTER — APPOINTMENT (OUTPATIENT)
Dept: UROLOGY | Facility: CLINIC | Age: 85
End: 2025-08-26

## 2025-09-05 DIAGNOSIS — R35.0 FREQUENCY OF MICTURITION: ICD-10-CM

## 2025-09-05 RX ORDER — MIRABEGRON 25 MG/1
25 TABLET, FILM COATED, EXTENDED RELEASE ORAL
Qty: 90 | Refills: 3 | Status: ACTIVE | COMMUNITY
Start: 2025-09-05 | End: 1900-01-01

## 2025-09-08 ENCOUNTER — NON-APPOINTMENT (OUTPATIENT)
Age: 85
End: 2025-09-08

## 2025-09-19 ENCOUNTER — RX RENEWAL (OUTPATIENT)
Age: 85
End: 2025-09-19

## (undated) DEVICE — FOLEY CATH 3-WAY 22FR 30CC LATEX LUBRICATH

## (undated) DEVICE — PREP BETADINE KIT

## (undated) DEVICE — FOLEY CATH 3-WAY 24FR 30CC LATEX HEMATURIA COUDE

## (undated) DEVICE — FOLEY CATH 3-WAY 20FR 30CC LATEX LUBRICATH

## (undated) DEVICE — FOLEY CATH 3-WAY 24FR 30CC LATEX LUBRICATH

## (undated) DEVICE — PACK CYSTO

## (undated) DEVICE — TUBING TUR 2 PRONG

## (undated) DEVICE — POSITIONER FOAM EGG CRATE ULNAR 2PCS (PINK)

## (undated) DEVICE — GLV 7.5 PROTEXIS (WHITE)

## (undated) DEVICE — FOLEY CATH 3-WAY 22FR 30CC LATEX HEMATURIA COUDE

## (undated) DEVICE — UROVAC

## (undated) DEVICE — ELCTR PLASMA BUTTON OVAL 24FR 12-30 DEG

## (undated) DEVICE — LUBRICATING JELLY ONESHOT 1.25OZ

## (undated) DEVICE — ELCTR PLASMA BUTTON 24FR 12-30 DEG

## (undated) DEVICE — ELCTR PLASMA LOOP MEDIUM ANGLED 24FR 12-30 DEG

## (undated) DEVICE — CLIPPER BLADE GENERAL USE

## (undated) DEVICE — TUBING RANGER FLUID IRRIGATION SET DISP

## (undated) DEVICE — DRAINAGE BAG URINARY 4L

## (undated) DEVICE — FOLEY CATH 3-WAY 20FR 30CC LATEX HEMATURIA COUDE